# Patient Record
Sex: MALE | Race: BLACK OR AFRICAN AMERICAN | Employment: UNEMPLOYED | URBAN - NONMETROPOLITAN AREA
[De-identification: names, ages, dates, MRNs, and addresses within clinical notes are randomized per-mention and may not be internally consistent; named-entity substitution may affect disease eponyms.]

---

## 2017-01-01 ENCOUNTER — TELEPHONE (OUTPATIENT)
Dept: CARDIOLOGY CLINIC | Age: 67
End: 2017-01-01

## 2017-01-01 ENCOUNTER — APPOINTMENT (OUTPATIENT)
Dept: NUCLEAR MEDICINE | Age: 67
DRG: 291 | End: 2017-01-01
Payer: MEDICARE

## 2017-01-01 ENCOUNTER — HOSPITAL ENCOUNTER (INPATIENT)
Age: 67
LOS: 3 days | Discharge: NURSING, REHAB OR LONG-TERM CARE | DRG: 291 | End: 2017-10-16
Attending: FAMILY MEDICINE | Admitting: ANESTHESIOLOGY
Payer: MEDICARE

## 2017-01-01 ENCOUNTER — APPOINTMENT (OUTPATIENT)
Dept: CT IMAGING | Age: 67
End: 2017-01-01
Payer: MEDICARE

## 2017-01-01 ENCOUNTER — PROCEDURE VISIT (OUTPATIENT)
Dept: CARDIOLOGY CLINIC | Age: 67
End: 2017-01-01
Payer: MEDICARE

## 2017-01-01 ENCOUNTER — APPOINTMENT (OUTPATIENT)
Dept: GENERAL RADIOLOGY | Age: 67
DRG: 291 | End: 2017-01-01
Payer: MEDICARE

## 2017-01-01 ENCOUNTER — OFFICE VISIT (OUTPATIENT)
Dept: NEPHROLOGY | Age: 67
End: 2017-01-01

## 2017-01-01 ENCOUNTER — PROCEDURE VISIT (OUTPATIENT)
Dept: CARDIOLOGY | Age: 67
End: 2017-01-01

## 2017-01-01 ENCOUNTER — OFFICE VISIT (OUTPATIENT)
Dept: CARDIOLOGY | Age: 67
End: 2017-01-01

## 2017-01-01 ENCOUNTER — OFFICE VISIT (OUTPATIENT)
Dept: NEPHROLOGY | Age: 67
End: 2017-01-01
Payer: MEDICARE

## 2017-01-01 ENCOUNTER — APPOINTMENT (OUTPATIENT)
Dept: CT IMAGING | Age: 67
DRG: 682 | End: 2017-01-01
Payer: MEDICARE

## 2017-01-01 ENCOUNTER — NURSE ONLY (OUTPATIENT)
Dept: CARDIOLOGY CLINIC | Age: 67
End: 2017-01-01
Payer: MEDICARE

## 2017-01-01 ENCOUNTER — HOSPITAL ENCOUNTER (INPATIENT)
Age: 67
LOS: 3 days | Discharge: NURSING, REHAB OR LONG-TERM CARE | DRG: 682 | End: 2017-09-14
Attending: FAMILY MEDICINE | Admitting: INTERNAL MEDICINE
Payer: MEDICARE

## 2017-01-01 ENCOUNTER — APPOINTMENT (OUTPATIENT)
Dept: GENERAL RADIOLOGY | Age: 67
DRG: 682 | End: 2017-01-01
Payer: MEDICARE

## 2017-01-01 ENCOUNTER — HOSPITAL ENCOUNTER (EMERGENCY)
Age: 67
Discharge: OTHER INSTITUTION | End: 2017-09-07
Attending: FAMILY MEDICINE
Payer: MEDICARE

## 2017-01-01 ENCOUNTER — APPOINTMENT (OUTPATIENT)
Dept: CT IMAGING | Age: 67
DRG: 291 | End: 2017-01-01
Payer: MEDICARE

## 2017-01-01 ENCOUNTER — APPOINTMENT (OUTPATIENT)
Dept: GENERAL RADIOLOGY | Age: 67
End: 2017-01-01
Payer: MEDICARE

## 2017-01-01 VITALS
OXYGEN SATURATION: 95 % | RESPIRATION RATE: 20 BRPM | WEIGHT: 223.56 LBS | TEMPERATURE: 98.1 F | BODY MASS INDEX: 30.28 KG/M2 | SYSTOLIC BLOOD PRESSURE: 168 MMHG | HEIGHT: 72 IN | HEART RATE: 77 BPM | DIASTOLIC BLOOD PRESSURE: 94 MMHG

## 2017-01-01 VITALS
DIASTOLIC BLOOD PRESSURE: 78 MMHG | BODY MASS INDEX: 31.15 KG/M2 | OXYGEN SATURATION: 95 % | HEART RATE: 71 BPM | WEIGHT: 230 LBS | TEMPERATURE: 97.8 F | HEIGHT: 72 IN | SYSTOLIC BLOOD PRESSURE: 139 MMHG | RESPIRATION RATE: 19 BRPM

## 2017-01-01 VITALS — SYSTOLIC BLOOD PRESSURE: 104 MMHG | DIASTOLIC BLOOD PRESSURE: 66 MMHG | RESPIRATION RATE: 16 BRPM | HEART RATE: 68 BPM

## 2017-01-01 VITALS — HEIGHT: 72 IN | DIASTOLIC BLOOD PRESSURE: 73 MMHG | HEART RATE: 64 BPM | SYSTOLIC BLOOD PRESSURE: 121 MMHG

## 2017-01-01 VITALS — HEART RATE: 68 BPM | SYSTOLIC BLOOD PRESSURE: 102 MMHG | RESPIRATION RATE: 16 BRPM | DIASTOLIC BLOOD PRESSURE: 70 MMHG

## 2017-01-01 VITALS
HEART RATE: 50 BPM | SYSTOLIC BLOOD PRESSURE: 137 MMHG | BODY MASS INDEX: 31.56 KG/M2 | HEIGHT: 72 IN | DIASTOLIC BLOOD PRESSURE: 80 MMHG | WEIGHT: 233 LBS

## 2017-01-01 VITALS
HEIGHT: 72 IN | HEART RATE: 62 BPM | BODY MASS INDEX: 31.97 KG/M2 | SYSTOLIC BLOOD PRESSURE: 147 MMHG | WEIGHT: 236 LBS | DIASTOLIC BLOOD PRESSURE: 74 MMHG

## 2017-01-01 VITALS
WEIGHT: 226 LBS | HEIGHT: 72 IN | SYSTOLIC BLOOD PRESSURE: 138 MMHG | OXYGEN SATURATION: 95 % | TEMPERATURE: 97.8 F | RESPIRATION RATE: 18 BRPM | DIASTOLIC BLOOD PRESSURE: 70 MMHG | HEART RATE: 79 BPM | BODY MASS INDEX: 30.61 KG/M2

## 2017-01-01 VITALS — SYSTOLIC BLOOD PRESSURE: 142 MMHG | HEART RATE: 68 BPM | DIASTOLIC BLOOD PRESSURE: 80 MMHG

## 2017-01-01 VITALS — HEART RATE: 56 BPM | SYSTOLIC BLOOD PRESSURE: 122 MMHG | DIASTOLIC BLOOD PRESSURE: 84 MMHG

## 2017-01-01 DIAGNOSIS — I10 BENIGN ESSENTIAL HTN: ICD-10-CM

## 2017-01-01 DIAGNOSIS — E55.9 VITAMIN D DEFICIENCY: ICD-10-CM

## 2017-01-01 DIAGNOSIS — I50.9 ACUTE CONGESTIVE HEART FAILURE, UNSPECIFIED CONGESTIVE HEART FAILURE TYPE: ICD-10-CM

## 2017-01-01 DIAGNOSIS — E78.00 PURE HYPERCHOLESTEROLEMIA: ICD-10-CM

## 2017-01-01 DIAGNOSIS — Z98.890 S/P CARDIAC CATH: ICD-10-CM

## 2017-01-01 DIAGNOSIS — I50.22 CHF (CONGESTIVE HEART FAILURE), NYHA CLASS III, CHRONIC, SYSTOLIC (HCC): ICD-10-CM

## 2017-01-01 DIAGNOSIS — N17.9 AKI (ACUTE KIDNEY INJURY) (HCC): ICD-10-CM

## 2017-01-01 DIAGNOSIS — I50.42 CHRONIC COMBINED SYSTOLIC AND DIASTOLIC CHF (CONGESTIVE HEART FAILURE) (HCC): Primary | ICD-10-CM

## 2017-01-01 DIAGNOSIS — I73.9 PAD (PERIPHERAL ARTERY DISEASE) (HCC): ICD-10-CM

## 2017-01-01 DIAGNOSIS — E08.22 DIABETES MELLITUS DUE TO UNDERLYING CONDITION WITH STAGE 4 CHRONIC KIDNEY DISEASE, UNSPECIFIED LONG TERM INSULIN USE STATUS: ICD-10-CM

## 2017-01-01 DIAGNOSIS — D50.9 IRON DEFICIENCY ANEMIA, UNSPECIFIED IRON DEFICIENCY ANEMIA TYPE: ICD-10-CM

## 2017-01-01 DIAGNOSIS — I42.9 CARDIOMYOPATHY (HCC): ICD-10-CM

## 2017-01-01 DIAGNOSIS — R09.02 HYPOXIA: Primary | ICD-10-CM

## 2017-01-01 DIAGNOSIS — I50.42 CHRONIC COMBINED SYSTOLIC AND DIASTOLIC CHF (CONGESTIVE HEART FAILURE) (HCC): ICD-10-CM

## 2017-01-01 DIAGNOSIS — N18.4 CKD (CHRONIC KIDNEY DISEASE), STAGE IV (HCC): Primary | ICD-10-CM

## 2017-01-01 DIAGNOSIS — I50.22 SYSTOLIC CHF, CHRONIC (HCC): ICD-10-CM

## 2017-01-01 DIAGNOSIS — Z79.4 TYPE 2 DIABETES MELLITUS WITH STAGE 3 CHRONIC KIDNEY DISEASE, WITH LONG-TERM CURRENT USE OF INSULIN (HCC): ICD-10-CM

## 2017-01-01 DIAGNOSIS — N18.30 CKD (CHRONIC KIDNEY DISEASE), STAGE III (HCC): ICD-10-CM

## 2017-01-01 DIAGNOSIS — Z95.810 ICD (IMPLANTABLE CARDIOVERTER-DEFIBRILLATOR) IN PLACE: Primary | ICD-10-CM

## 2017-01-01 DIAGNOSIS — I50.43 ACUTE ON CHRONIC COMBINED SYSTOLIC AND DIASTOLIC CONGESTIVE HEART FAILURE (HCC): ICD-10-CM

## 2017-01-01 DIAGNOSIS — Z95.810 HISTORY OF IMPLANTABLE CARDIOVERTER-DEFIBRILLATOR (ICD) PLACEMENT: ICD-10-CM

## 2017-01-01 DIAGNOSIS — I69.359 HEMIPARESIS DUE TO OLD CEREBROVASCULAR ACCIDENT (HCC): ICD-10-CM

## 2017-01-01 DIAGNOSIS — N18.9 CHRONIC RENAL INSUFFICIENCY, UNSPECIFIED STAGE: ICD-10-CM

## 2017-01-01 DIAGNOSIS — N25.81 SECONDARY RENAL HYPERPARATHYROIDISM (HCC): ICD-10-CM

## 2017-01-01 DIAGNOSIS — I10 ESSENTIAL HYPERTENSION: ICD-10-CM

## 2017-01-01 DIAGNOSIS — I42.9 CARDIOMYOPATHY (HCC): Primary | ICD-10-CM

## 2017-01-01 DIAGNOSIS — N18.30 CKD (CHRONIC KIDNEY DISEASE), STAGE III (HCC): Primary | ICD-10-CM

## 2017-01-01 DIAGNOSIS — I42.9 CARDIOMYOPATHY, UNSPECIFIED TYPE (HCC): ICD-10-CM

## 2017-01-01 DIAGNOSIS — Z95.810 ICD (IMPLANTABLE CARDIOVERTER-DEFIBRILLATOR) IN PLACE: ICD-10-CM

## 2017-01-01 DIAGNOSIS — Z79.4 TYPE 2 DIABETES MELLITUS WITH COMPLICATION, WITH LONG-TERM CURRENT USE OF INSULIN (HCC): ICD-10-CM

## 2017-01-01 DIAGNOSIS — R79.89 ELEVATED D-DIMER: ICD-10-CM

## 2017-01-01 DIAGNOSIS — N18.30 TYPE 2 DIABETES MELLITUS WITH STAGE 3 CHRONIC KIDNEY DISEASE, WITH LONG-TERM CURRENT USE OF INSULIN (HCC): ICD-10-CM

## 2017-01-01 DIAGNOSIS — I21.4 NSTEMI (NON-ST ELEVATED MYOCARDIAL INFARCTION) (HCC): ICD-10-CM

## 2017-01-01 DIAGNOSIS — I10 ESSENTIAL HYPERTENSION: Primary | ICD-10-CM

## 2017-01-01 DIAGNOSIS — N18.4 DIABETES MELLITUS DUE TO UNDERLYING CONDITION WITH STAGE 4 CHRONIC KIDNEY DISEASE, UNSPECIFIED LONG TERM INSULIN USE STATUS: ICD-10-CM

## 2017-01-01 DIAGNOSIS — I25.10 CORONARY ARTERY DISEASE INVOLVING NATIVE CORONARY ARTERY OF NATIVE HEART WITHOUT ANGINA PECTORIS: Primary | ICD-10-CM

## 2017-01-01 DIAGNOSIS — E11.8 TYPE 2 DIABETES MELLITUS WITH COMPLICATION, WITH LONG-TERM CURRENT USE OF INSULIN (HCC): ICD-10-CM

## 2017-01-01 DIAGNOSIS — R94.31 ABNORMAL EKG: ICD-10-CM

## 2017-01-01 DIAGNOSIS — I25.10 CORONARY ARTERY DISEASE INVOLVING NATIVE CORONARY ARTERY OF NATIVE HEART WITHOUT ANGINA PECTORIS: ICD-10-CM

## 2017-01-01 DIAGNOSIS — E16.2 HYPOGLYCEMIA: Primary | ICD-10-CM

## 2017-01-01 DIAGNOSIS — R77.8 ELEVATED TROPONIN: ICD-10-CM

## 2017-01-01 DIAGNOSIS — E11.22 TYPE 2 DIABETES MELLITUS WITH STAGE 3 CHRONIC KIDNEY DISEASE, WITH LONG-TERM CURRENT USE OF INSULIN (HCC): ICD-10-CM

## 2017-01-01 DIAGNOSIS — D72.829 LEUKOCYTOSIS, UNSPECIFIED TYPE: ICD-10-CM

## 2017-01-01 DIAGNOSIS — I63.9 CEREBROVASCULAR ACCIDENT (CVA), UNSPECIFIED MECHANISM (HCC): Primary | ICD-10-CM

## 2017-01-01 LAB
ALBUMIN SERPL-MCNC: 3 G/DL (ref 3.5–5.1)
ALBUMIN SERPL-MCNC: 3.6 G/DL (ref 3.5–5.1)
ALBUMIN SERPL-MCNC: 3.6 G/DL (ref 3.5–5.1)
ALBUMIN SERPL-MCNC: 4.1 G/DL (ref 3.5–5.1)
ALLEN TEST: ABNORMAL
ALP BLD-CCNC: 77 U/L (ref 38–126)
ALP BLD-CCNC: 95 U/L (ref 38–126)
ALP BLD-CCNC: 98 U/L (ref 38–126)
ALP BLD-CCNC: 99 U/L (ref 38–126)
ALT SERPL-CCNC: 12 U/L (ref 11–66)
ALT SERPL-CCNC: 13 U/L (ref 11–66)
ALT SERPL-CCNC: 13 U/L (ref 11–66)
ALT SERPL-CCNC: 14 U/L (ref 11–66)
ANION GAP SERPL CALCULATED.3IONS-SCNC: 11 MEQ/L (ref 8–16)
ANION GAP SERPL CALCULATED.3IONS-SCNC: 12 MEQ/L (ref 8–16)
ANION GAP SERPL CALCULATED.3IONS-SCNC: 13 MEQ/L (ref 8–16)
ANION GAP SERPL CALCULATED.3IONS-SCNC: 13 MEQ/L (ref 8–16)
ANION GAP SERPL CALCULATED.3IONS-SCNC: 15 MEQ/L (ref 8–16)
ANION GAP SERPL CALCULATED.3IONS-SCNC: 15 MEQ/L (ref 8–16)
ANION GAP SERPL CALCULATED.3IONS-SCNC: 16 MEQ/L (ref 8–16)
ANION GAP SERPL CALCULATED.3IONS-SCNC: 16 MEQ/L (ref 8–16)
ANION GAP SERPL CALCULATED.3IONS-SCNC: 9 MEQ/L (ref 8–16)
ANISOCYTOSIS: ABNORMAL
APTT: 121.5 SECONDS (ref 22–38)
APTT: 31.4 SECONDS (ref 22–38)
APTT: 31.8 SECONDS (ref 22–38)
APTT: 32.1 SECONDS (ref 22–38)
APTT: 39.8 SECONDS (ref 22–38)
APTT: 46.6 SECONDS (ref 22–38)
APTT: 54.6 SECONDS (ref 22–38)
APTT: 61.7 SECONDS (ref 22–38)
APTT: 70.3 SECONDS (ref 22–38)
APTT: 81.4 SECONDS (ref 22–38)
AST SERPL-CCNC: 16 U/L (ref 5–40)
AST SERPL-CCNC: 16 U/L (ref 5–40)
AST SERPL-CCNC: 17 U/L (ref 5–40)
AST SERPL-CCNC: 18 U/L (ref 5–40)
AVERAGE GLUCOSE: 108 MG/DL (ref 70–126)
BACTERIA: ABNORMAL
BASE EXCESS (CALCULATED): -0.5 MMOL/L (ref -2.5–2.5)
BASE EXCESS (CALCULATED): -1.1 MMOL/L (ref -2.5–2.5)
BASE EXCESS (CALCULATED): -3.3 MMOL/L (ref -2.5–2.5)
BASE EXCESS MIXED: 0.3 MMOL/L (ref -2–3)
BASOPHILS # BLD: 0.3 %
BASOPHILS # BLD: 0.4 %
BASOPHILS # BLD: 0.5 %
BASOPHILS # BLD: 0.7 %
BASOPHILS # BLD: 0.8 %
BASOPHILS ABSOLUTE: 0 THOU/MM3 (ref 0–0.1)
BASOPHILS ABSOLUTE: 0.1 THOU/MM3 (ref 0–0.1)
BILIRUB SERPL-MCNC: 0.2 MG/DL (ref 0.3–1.2)
BILIRUB SERPL-MCNC: 0.3 MG/DL (ref 0.3–1.2)
BILIRUB SERPL-MCNC: 0.3 MG/DL (ref 0.3–1.2)
BILIRUB SERPL-MCNC: 0.4 MG/DL (ref 0.3–1.2)
BILIRUBIN DIRECT: < 0.2 MG/DL (ref 0–0.3)
BILIRUBIN DIRECT: < 0.2 MG/DL (ref 0–0.3)
BILIRUBIN URINE: NEGATIVE
BLOOD CULTURE, ROUTINE: NORMAL
BLOOD CULTURE, ROUTINE: NORMAL
BLOOD, URINE: NEGATIVE
BUN BLDV-MCNC: 25 MG/DL
BUN BLDV-MCNC: 27 MG/DL (ref 7–22)
BUN BLDV-MCNC: 28 MG/DL (ref 7–22)
BUN BLDV-MCNC: 33 MG/DL (ref 7–22)
BUN BLDV-MCNC: 33 MG/DL (ref 7–22)
BUN BLDV-MCNC: 35 MG/DL (ref 7–22)
BUN BLDV-MCNC: 36 MG/DL (ref 7–22)
BUN BLDV-MCNC: 36 MG/DL (ref 7–22)
BUN BLDV-MCNC: 41 MG/DL (ref 7–22)
BUN BLDV-MCNC: 42 MG/DL (ref 7–22)
CALCIUM SERPL-MCNC: 8.5 MG/DL
CALCIUM SERPL-MCNC: 8.8 MG/DL (ref 8.5–10.5)
CALCIUM SERPL-MCNC: 8.8 MG/DL (ref 8.5–10.5)
CALCIUM SERPL-MCNC: 9 MG/DL (ref 8.5–10.5)
CALCIUM SERPL-MCNC: 9.3 MG/DL (ref 8.5–10.5)
CALCIUM SERPL-MCNC: 9.4 MG/DL (ref 8.5–10.5)
CALCIUM SERPL-MCNC: 9.8 MG/DL (ref 8.5–10.5)
CALCIUM SERPL-MCNC: 9.8 MG/DL (ref 8.5–10.5)
CASTS: ABNORMAL /LPF
CASTS: ABNORMAL /LPF
CHARACTER, URINE: ABNORMAL
CHLORIDE BLD-SCNC: 100 MEQ/L (ref 98–111)
CHLORIDE BLD-SCNC: 103 MEQ/L (ref 98–111)
CHLORIDE BLD-SCNC: 103 MEQ/L (ref 98–111)
CHLORIDE BLD-SCNC: 104 MEQ/L (ref 98–111)
CHLORIDE BLD-SCNC: 106 MEQ/L (ref 98–111)
CHLORIDE BLD-SCNC: 107 MEQ/L (ref 98–111)
CHLORIDE BLD-SCNC: 110 MMOL/L
CHLORIDE BLD-SCNC: 98 MEQ/L (ref 98–111)
CHOLESTEROL, TOTAL: 77 MG/DL (ref 100–199)
CHP ED QC CHECK: YES
CHP ED QC CHECK: YES
CO2: 110 MMOL/L
CO2: 22 MEQ/L (ref 23–33)
CO2: 23 MEQ/L (ref 23–33)
CO2: 24 MEQ/L (ref 23–33)
CO2: 26 MEQ/L (ref 23–33)
CO2: 26 MEQ/L (ref 23–33)
CO2: 27 MEQ/L (ref 23–33)
CO2: 30 MEQ/L (ref 23–33)
COLLECTED BY:: ABNORMAL
COLOR: YELLOW
CREAT SERPL-MCNC: 2.3 MG/DL (ref 0.4–1.2)
CREAT SERPL-MCNC: 2.6 MG/DL
CREAT SERPL-MCNC: 2.6 MG/DL (ref 0.4–1.2)
CREAT SERPL-MCNC: 2.6 MG/DL (ref 0.4–1.2)
CREAT SERPL-MCNC: 2.8 MG/DL (ref 0.4–1.2)
CREAT SERPL-MCNC: 2.9 MG/DL (ref 0.4–1.2)
CREAT SERPL-MCNC: 3 MG/DL (ref 0.4–1.2)
CREAT SERPL-MCNC: 3.4 MG/DL (ref 0.4–1.2)
CRYSTALS: ABNORMAL
D-DIMER QUANTITATIVE: 1301 NG/ML FEU (ref 0–500)
DEVICE: ABNORMAL
EKG ATRIAL RATE: 60 BPM
EKG ATRIAL RATE: 61 BPM
EKG ATRIAL RATE: 76 BPM
EKG ATRIAL RATE: 77 BPM
EKG P AXIS: -6 DEGREES
EKG P AXIS: 11 DEGREES
EKG P AXIS: 20 DEGREES
EKG P AXIS: 61 DEGREES
EKG P-R INTERVAL: 166 MS
EKG P-R INTERVAL: 170 MS
EKG P-R INTERVAL: 180 MS
EKG P-R INTERVAL: 190 MS
EKG Q-T INTERVAL: 384 MS
EKG Q-T INTERVAL: 432 MS
EKG Q-T INTERVAL: 450 MS
EKG Q-T INTERVAL: 452 MS
EKG QRS DURATION: 124 MS
EKG QRS DURATION: 128 MS
EKG QRS DURATION: 128 MS
EKG QRS DURATION: 142 MS
EKG QTC CALCULATION (BAZETT): 434 MS
EKG QTC CALCULATION (BAZETT): 452 MS
EKG QTC CALCULATION (BAZETT): 453 MS
EKG QTC CALCULATION (BAZETT): 486 MS
EKG R AXIS: -13 DEGREES
EKG R AXIS: -13 DEGREES
EKG R AXIS: -18 DEGREES
EKG R AXIS: -29 DEGREES
EKG T AXIS: -131 DEGREES
EKG T AXIS: 68 DEGREES
EKG T AXIS: 92 DEGREES
EKG T AXIS: 99 DEGREES
EKG VENTRICULAR RATE: 60 BPM
EKG VENTRICULAR RATE: 61 BPM
EKG VENTRICULAR RATE: 76 BPM
EKG VENTRICULAR RATE: 77 BPM
EOSINOPHIL # BLD: 0.1 %
EOSINOPHIL # BLD: 0.7 %
EOSINOPHIL # BLD: 2.1 %
EOSINOPHIL # BLD: 2.6 %
EOSINOPHIL # BLD: 2.8 %
EOSINOPHIL # BLD: 3 %
EOSINOPHIL # BLD: 3.7 %
EOSINOPHIL # BLD: 3.8 %
EOSINOPHIL # BLD: 4 %
EOSINOPHILS ABSOLUTE: 0 THOU/MM3 (ref 0–0.4)
EOSINOPHILS ABSOLUTE: 0.1 THOU/MM3 (ref 0–0.4)
EOSINOPHILS ABSOLUTE: 0.2 THOU/MM3 (ref 0–0.4)
EOSINOPHILS ABSOLUTE: 0.2 THOU/MM3 (ref 0–0.4)
EOSINOPHILS ABSOLUTE: 0.3 THOU/MM3 (ref 0–0.4)
EOSINOPHILS ABSOLUTE: 0.4 THOU/MM3 (ref 0–0.4)
EPITHELIAL CELLS, UA: ABNORMAL /HPF
FLU A ANTIGEN: NEGATIVE
FLU B ANTIGEN: NEGATIVE
FOLATE: 19.3 NG/ML (ref 4.8–24.2)
GFR CALCULATED: 25
GFR SERPL CREATININE-BSD FRML MDRD: 22 ML/MIN/1.73M2
GFR SERPL CREATININE-BSD FRML MDRD: 25 ML/MIN/1.73M2
GFR SERPL CREATININE-BSD FRML MDRD: 26 ML/MIN/1.73M2
GFR SERPL CREATININE-BSD FRML MDRD: 27 ML/MIN/1.73M2
GFR SERPL CREATININE-BSD FRML MDRD: 30 ML/MIN/1.73M2
GFR SERPL CREATININE-BSD FRML MDRD: 30 ML/MIN/1.73M2
GFR SERPL CREATININE-BSD FRML MDRD: 34 ML/MIN/1.73M2
GLUCOSE BLD-MCNC: 101 MG/DL (ref 70–108)
GLUCOSE BLD-MCNC: 107 MG/DL (ref 70–108)
GLUCOSE BLD-MCNC: 110 MG/DL
GLUCOSE BLD-MCNC: 110 MG/DL (ref 70–108)
GLUCOSE BLD-MCNC: 116 MG/DL (ref 70–108)
GLUCOSE BLD-MCNC: 121 MG/DL (ref 70–108)
GLUCOSE BLD-MCNC: 122 MG/DL (ref 70–108)
GLUCOSE BLD-MCNC: 125 MG/DL (ref 70–108)
GLUCOSE BLD-MCNC: 132 MG/DL (ref 70–108)
GLUCOSE BLD-MCNC: 133 MG/DL (ref 70–108)
GLUCOSE BLD-MCNC: 139 MG/DL (ref 70–108)
GLUCOSE BLD-MCNC: 147 MG/DL
GLUCOSE BLD-MCNC: 150 MG/DL (ref 70–108)
GLUCOSE BLD-MCNC: 150 MG/DL (ref 70–108)
GLUCOSE BLD-MCNC: 152 MG/DL (ref 70–108)
GLUCOSE BLD-MCNC: 153 MG/DL (ref 70–108)
GLUCOSE BLD-MCNC: 154 MG/DL (ref 70–108)
GLUCOSE BLD-MCNC: 154 MG/DL (ref 70–108)
GLUCOSE BLD-MCNC: 157 MG/DL (ref 70–108)
GLUCOSE BLD-MCNC: 164 MG/DL
GLUCOSE BLD-MCNC: 164 MG/DL (ref 70–108)
GLUCOSE BLD-MCNC: 164 MG/DL (ref 70–108)
GLUCOSE BLD-MCNC: 167 MG/DL (ref 70–108)
GLUCOSE BLD-MCNC: 190 MG/DL (ref 70–108)
GLUCOSE BLD-MCNC: 221 MG/DL (ref 70–108)
GLUCOSE BLD-MCNC: 223 MG/DL (ref 70–108)
GLUCOSE BLD-MCNC: 236 MG/DL (ref 70–108)
GLUCOSE BLD-MCNC: 258 MG/DL (ref 70–108)
GLUCOSE BLD-MCNC: 54 MG/DL (ref 70–108)
GLUCOSE BLD-MCNC: 55 MG/DL
GLUCOSE BLD-MCNC: 55 MG/DL (ref 70–108)
GLUCOSE BLD-MCNC: 67 MG/DL (ref 70–108)
GLUCOSE BLD-MCNC: 87 MG/DL (ref 70–108)
GLUCOSE BLD-MCNC: 89 MG/DL (ref 70–108)
GLUCOSE BLD-MCNC: 97 MG/DL (ref 70–108)
GLUCOSE, URINE: NEGATIVE MG/DL
HBA1C MFR BLD: 5.6 % (ref 4.4–6.4)
HCO3, MIXED: 25 MMOL/L (ref 23–28)
HCO3: 23 MMOL/L (ref 23–28)
HCO3: 26 MMOL/L (ref 23–28)
HCO3: 27 MMOL/L (ref 23–28)
HCT VFR BLD CALC: 33.4 % (ref 42–52)
HCT VFR BLD CALC: 34 % (ref 42–52)
HCT VFR BLD CALC: 34.8 % (ref 42–52)
HCT VFR BLD CALC: 35.2 % (ref 42–52)
HCT VFR BLD CALC: 36.4 % (ref 42–52)
HCT VFR BLD CALC: 37.7 % (ref 42–52)
HCT VFR BLD CALC: 37.7 % (ref 42–52)
HCT VFR BLD CALC: 38 % (ref 42–52)
HCT VFR BLD CALC: 38.3 % (ref 42–52)
HCT VFR BLD CALC: 39.2 % (ref 42–52)
HDLC SERPL-MCNC: 28 MG/DL
HEMOGLOBIN: 11.1 GM/DL (ref 14–18)
HEMOGLOBIN: 11.1 GM/DL (ref 14–18)
HEMOGLOBIN: 11.2 GM/DL (ref 14–18)
HEMOGLOBIN: 11.3 GM/DL (ref 14–18)
HEMOGLOBIN: 11.6 GM/DL (ref 14–18)
HEMOGLOBIN: 12.2 GM/DL (ref 14–18)
HEMOGLOBIN: 12.5 GM/DL (ref 14–18)
HEMOGLOBIN: 12.6 GM/DL (ref 14–18)
HEMOGLOBIN: 12.7 GM/DL (ref 14–18)
HEMOGLOBIN: 12.7 GM/DL (ref 14–18)
IFIO2: 50
IFIO2: 50
INR BLD: 1.06 (ref 0.85–1.13)
INR BLD: 1.09 (ref 0.85–1.13)
INR BLD: 1.26 (ref 0.85–1.13)
KETONES, URINE: NEGATIVE
LACTIC ACID: 0.8 MMOL/L (ref 0.5–2.2)
LDL CHOLESTEROL CALCULATED: 24 MG/DL
LEUKOCYTE ESTERASE, URINE: ABNORMAL
LIPASE: 9 U/L (ref 5.6–51.3)
LV EF: 38 %
LVEF MODALITY: NORMAL
LYMPHOCYTES # BLD: 17.3 %
LYMPHOCYTES # BLD: 17.7 %
LYMPHOCYTES # BLD: 17.8 %
LYMPHOCYTES # BLD: 18.7 %
LYMPHOCYTES # BLD: 22.5 %
LYMPHOCYTES # BLD: 23.3 %
LYMPHOCYTES # BLD: 23.7 %
LYMPHOCYTES # BLD: 26 %
LYMPHOCYTES # BLD: 27.4 %
LYMPHOCYTES ABSOLUTE: 1.4 THOU/MM3 (ref 1–4.8)
LYMPHOCYTES ABSOLUTE: 1.6 THOU/MM3 (ref 1–4.8)
LYMPHOCYTES ABSOLUTE: 1.8 THOU/MM3 (ref 1–4.8)
LYMPHOCYTES ABSOLUTE: 2 THOU/MM3 (ref 1–4.8)
LYMPHOCYTES ABSOLUTE: 2 THOU/MM3 (ref 1–4.8)
LYMPHOCYTES ABSOLUTE: 2.1 THOU/MM3 (ref 1–4.8)
LYMPHOCYTES ABSOLUTE: 2.3 THOU/MM3 (ref 1–4.8)
LYMPHOCYTES ABSOLUTE: 2.7 THOU/MM3 (ref 1–4.8)
LYMPHOCYTES ABSOLUTE: 2.7 THOU/MM3 (ref 1–4.8)
MAGNESIUM: 2.1 MG/DL (ref 1.6–2.4)
MAGNESIUM: 2.1 MG/DL (ref 1.6–2.4)
MCH RBC QN AUTO: 30.9 PG (ref 27–31)
MCH RBC QN AUTO: 31 PG (ref 27–31)
MCH RBC QN AUTO: 31.3 PG (ref 27–31)
MCH RBC QN AUTO: 31.4 PG (ref 27–31)
MCH RBC QN AUTO: 31.4 PG (ref 27–31)
MCH RBC QN AUTO: 31.5 PG (ref 27–31)
MCH RBC QN AUTO: 32 PG (ref 27–31)
MCH RBC QN AUTO: 32 PG (ref 27–31)
MCH RBC QN AUTO: 32.1 PG (ref 27–31)
MCH RBC QN AUTO: 32.2 PG (ref 27–31)
MCHC RBC AUTO-ENTMCNC: 32 GM/DL (ref 33–37)
MCHC RBC AUTO-ENTMCNC: 32.4 GM/DL (ref 33–37)
MCHC RBC AUTO-ENTMCNC: 32.4 GM/DL (ref 33–37)
MCHC RBC AUTO-ENTMCNC: 32.7 GM/DL (ref 33–37)
MCHC RBC AUTO-ENTMCNC: 33.1 GM/DL (ref 33–37)
MCHC RBC AUTO-ENTMCNC: 33.1 GM/DL (ref 33–37)
MCHC RBC AUTO-ENTMCNC: 33.3 GM/DL (ref 33–37)
MCHC RBC AUTO-ENTMCNC: 33.5 GM/DL (ref 33–37)
MCV RBC AUTO: 95.9 FL (ref 80–94)
MCV RBC AUTO: 96.1 FL (ref 80–94)
MCV RBC AUTO: 96.1 FL (ref 80–94)
MCV RBC AUTO: 96.7 FL (ref 80–94)
MCV RBC AUTO: 96.9 FL (ref 80–94)
MCV RBC AUTO: 97 FL (ref 80–94)
MCV RBC AUTO: 97.1 FL (ref 80–94)
MCV RBC AUTO: 97.2 FL (ref 80–94)
MCV RBC AUTO: 97.3 FL (ref 80–94)
MCV RBC AUTO: 98.3 FL (ref 80–94)
MISCELLANEOUS LAB TEST RESULT: ABNORMAL
MODE: ABNORMAL
MODE: ABNORMAL
MONOCYTES # BLD: 10.2 %
MONOCYTES # BLD: 11.7 %
MONOCYTES # BLD: 11.9 %
MONOCYTES # BLD: 11.9 %
MONOCYTES # BLD: 12.7 %
MONOCYTES # BLD: 12.8 %
MONOCYTES # BLD: 13.6 %
MONOCYTES # BLD: 13.7 %
MONOCYTES # BLD: 14.2 %
MONOCYTES ABSOLUTE: 0.8 THOU/MM3 (ref 0.4–1.3)
MONOCYTES ABSOLUTE: 1 THOU/MM3 (ref 0.4–1.3)
MONOCYTES ABSOLUTE: 1.1 THOU/MM3 (ref 0.4–1.3)
MONOCYTES ABSOLUTE: 1.1 THOU/MM3 (ref 0.4–1.3)
MONOCYTES ABSOLUTE: 1.2 THOU/MM3 (ref 0.4–1.3)
MONOCYTES ABSOLUTE: 1.3 THOU/MM3 (ref 0.4–1.3)
MONOCYTES ABSOLUTE: 1.4 THOU/MM3 (ref 0.4–1.3)
MONOCYTES ABSOLUTE: 1.4 THOU/MM3 (ref 0.4–1.3)
MONOCYTES ABSOLUTE: 1.5 THOU/MM3 (ref 0.4–1.3)
MRSA SCREEN: NORMAL
NITRITE, URINE: NEGATIVE
NUCLEATED RED BLOOD CELLS: 0 /100 WBC
NUCLEATED RED BLOOD CELLS: 2 /100 WBC
O2 SAT, MIXED: 95 %
O2 SATURATION: 91 %
O2 SATURATION: 99 %
O2 SATURATION: 99 %
ORGANISM: ABNORMAL
OSMOLALITY CALCULATION: 289.4 MOSMOL/KG (ref 275–300)
OSMOLALITY CALCULATION: 291.2 MOSMOL/KG (ref 275–300)
OSMOLALITY CALCULATION: 291.8 MOSMOL/KG (ref 275–300)
PCO2, MIXED VENOUS: 40 MMHG (ref 41–51)
PCO2: 43 MMHG (ref 35–45)
PCO2: 56 MMHG (ref 35–45)
PCO2: 56 MMHG (ref 35–45)
PDW BLD-RTO: 16.7 % (ref 11.5–14.5)
PDW BLD-RTO: 17 % (ref 11.5–14.5)
PDW BLD-RTO: 17.2 % (ref 11.5–14.5)
PDW BLD-RTO: 17.4 % (ref 11.5–14.5)
PDW BLD-RTO: 17.5 % (ref 11.5–14.5)
PDW BLD-RTO: 18.4 % (ref 11.5–14.5)
PDW BLD-RTO: 18.6 % (ref 11.5–14.5)
PDW BLD-RTO: 19.1 % (ref 11.5–14.5)
PDW BLD-RTO: 19.8 % (ref 11.5–14.5)
PDW BLD-RTO: 19.9 % (ref 11.5–14.5)
PH BLOOD GAS: 7.28 (ref 7.35–7.45)
PH BLOOD GAS: 7.29 (ref 7.35–7.45)
PH BLOOD GAS: 7.33 (ref 7.35–7.45)
PH UA: 6
PH, MIXED: 7.4 (ref 7.31–7.41)
PHOSPHORUS: 3.9 MG/DL (ref 2.4–4.7)
PHOSPHORUS: 4 MG/DL (ref 2.4–4.7)
PLATELET # BLD: 149 THOU/MM3 (ref 130–400)
PLATELET # BLD: 162 THOU/MM3 (ref 130–400)
PLATELET # BLD: 164 THOU/MM3 (ref 130–400)
PLATELET # BLD: 167 THOU/MM3 (ref 130–400)
PLATELET # BLD: 168 THOU/MM3 (ref 130–400)
PLATELET # BLD: 177 THOU/MM3 (ref 130–400)
PLATELET # BLD: 179 THOU/MM3 (ref 130–400)
PLATELET # BLD: 180 THOU/MM3 (ref 130–400)
PLATELET # BLD: 183 THOU/MM3 (ref 130–400)
PLATELET # BLD: 194 THOU/MM3 (ref 130–400)
PLATELET # BLD: 212 THOU/MM3 (ref 130–400)
PMV BLD AUTO: 8 MCM (ref 7.4–10.4)
PMV BLD AUTO: 8 MCM (ref 7.4–10.4)
PMV BLD AUTO: 8.3 MCM (ref 7.4–10.4)
PMV BLD AUTO: 8.4 MCM (ref 7.4–10.4)
PMV BLD AUTO: 8.5 MCM (ref 7.4–10.4)
PMV BLD AUTO: 8.6 MCM (ref 7.4–10.4)
PMV BLD AUTO: 8.7 MCM (ref 7.4–10.4)
PMV BLD AUTO: 9.2 MCM (ref 7.4–10.4)
PMV BLD AUTO: 9.5 MCM (ref 7.4–10.4)
PMV BLD AUTO: 9.6 MCM (ref 7.4–10.4)
PO2 MIXED: 77 MMHG (ref 25–40)
PO2: 171 MMHG (ref 71–104)
PO2: 175 MMHG (ref 71–104)
PO2: 70 MMHG (ref 71–104)
POTASSIUM SERPL-SCNC: 4.4 MEQ/L (ref 3.5–5.2)
POTASSIUM SERPL-SCNC: 4.6 MEQ/L (ref 3.5–5.2)
POTASSIUM SERPL-SCNC: 4.7 MEQ/L (ref 3.5–5.2)
POTASSIUM SERPL-SCNC: 4.7 MMOL/L
POTASSIUM SERPL-SCNC: 4.9 MEQ/L (ref 3.5–5.2)
POTASSIUM SERPL-SCNC: 4.9 MEQ/L (ref 3.5–5.2)
PRO-BNP: 1293 PG/ML (ref 0–900)
PRO-BNP: ABNORMAL PG/ML (ref 0–900)
PROCALCITONIN: 0.32 NG/ML (ref 0.01–0.09)
PROTEIN UA: 100 MG/DL
RBC # BLD: 3.45 MILL/MM3 (ref 4.7–6.1)
RBC # BLD: 3.54 MILL/MM3 (ref 4.7–6.1)
RBC # BLD: 3.58 MILL/MM3 (ref 4.7–6.1)
RBC # BLD: 3.58 MILL/MM3 (ref 4.7–6.1)
RBC # BLD: 3.75 MILL/MM3 (ref 4.7–6.1)
RBC # BLD: 3.88 MILL/MM3 (ref 4.7–6.1)
RBC # BLD: 3.89 MILL/MM3 (ref 4.7–6.1)
RBC # BLD: 3.95 MILL/MM3 (ref 4.7–6.1)
RBC # BLD: 3.98 MILL/MM3 (ref 4.7–6.1)
RBC # BLD: 4.06 MILL/MM3 (ref 4.7–6.1)
RBC # BLD: NORMAL 10*6/UL
RBC URINE: ABNORMAL /HPF
RENAL EPITHELIAL, UA: ABNORMAL
SEG NEUTROPHILS: 55.3 %
SEG NEUTROPHILS: 55.8 %
SEG NEUTROPHILS: 60.4 %
SEG NEUTROPHILS: 60.6 %
SEG NEUTROPHILS: 61.5 %
SEG NEUTROPHILS: 65.6 %
SEG NEUTROPHILS: 67.5 %
SEG NEUTROPHILS: 69 %
SEG NEUTROPHILS: 69.5 %
SEGMENTED NEUTROPHILS ABSOLUTE COUNT: 4 THOU/MM3 (ref 1.8–7.7)
SEGMENTED NEUTROPHILS ABSOLUTE COUNT: 4.3 THOU/MM3 (ref 1.8–7.7)
SEGMENTED NEUTROPHILS ABSOLUTE COUNT: 5.4 THOU/MM3 (ref 1.8–7.7)
SEGMENTED NEUTROPHILS ABSOLUTE COUNT: 6 THOU/MM3 (ref 1.8–7.7)
SEGMENTED NEUTROPHILS ABSOLUTE COUNT: 6.2 THOU/MM3 (ref 1.8–7.7)
SEGMENTED NEUTROPHILS ABSOLUTE COUNT: 6.9 THOU/MM3 (ref 1.8–7.7)
SEGMENTED NEUTROPHILS ABSOLUTE COUNT: 7 THOU/MM3 (ref 1.8–7.7)
SEGMENTED NEUTROPHILS ABSOLUTE COUNT: 7.1 THOU/MM3 (ref 1.8–7.7)
SEGMENTED NEUTROPHILS ABSOLUTE COUNT: 7.8 THOU/MM3 (ref 1.8–7.7)
SET PEEP: 6 MMHG
SET PEEP: 6 MMHG
SET PRESS SUPP: 12 CMH2O
SET PRESS SUPP: 8 CMH2O
SITE: ABNORMAL
SODIUM BLD-SCNC: 140 MEQ/L (ref 135–145)
SODIUM BLD-SCNC: 141 MEQ/L (ref 135–145)
SODIUM BLD-SCNC: 142 MEQ/L (ref 135–145)
SODIUM BLD-SCNC: 142 MMOL/L
SODIUM BLD-SCNC: 143 MEQ/L (ref 135–145)
SODIUM BLD-SCNC: 145 MEQ/L (ref 135–145)
SOURCE, BLOOD GAS: ABNORMAL
SPECIFIC GRAVITY UA: 1.01 (ref 1–1.03)
T4 FREE: 0.96 NG/DL (ref 0.93–1.76)
T4 FREE: 1.18 NG/DL (ref 0.93–1.76)
TOTAL PROTEIN: 6.9 G/DL (ref 6.1–8)
TOTAL PROTEIN: 7.8 G/DL (ref 6.1–8)
TOTAL PROTEIN: 8 G/DL (ref 6.1–8)
TOTAL PROTEIN: 8.3 G/DL (ref 6.1–8)
TRIGL SERPL-MCNC: 123 MG/DL (ref 0–199)
TROPONIN T: 0.03 NG/ML
TROPONIN T: 0.04 NG/ML
TROPONIN T: 0.04 NG/ML
TROPONIN T: 0.07 NG/ML
TROPONIN T: 0.1 NG/ML
TROPONIN T: 0.11 NG/ML
TSH SERPL DL<=0.05 MIU/L-ACNC: 2.45 UIU/ML (ref 0.4–4.2)
TSH SERPL DL<=0.05 MIU/L-ACNC: 2.71 UIU/ML (ref 0.4–4.2)
URINE CULTURE, ROUTINE: ABNORMAL
UROBILINOGEN, URINE: 0.2 EU/DL
VITAMIN B-12: 689 PG/ML (ref 211–911)
WBC # BLD: 10.2 THOU/MM3 (ref 4.8–10.8)
WBC # BLD: 10.3 THOU/MM3 (ref 4.8–10.8)
WBC # BLD: 11.3 THOU/MM3 (ref 4.8–10.8)
WBC # BLD: 11.4 THOU/MM3 (ref 4.8–10.8)
WBC # BLD: 11.4 THOU/MM3 (ref 4.8–10.8)
WBC # BLD: 12.8 THOU/MM3 (ref 4.8–10.8)
WBC # BLD: 7.2 THOU/MM3 (ref 4.8–10.8)
WBC # BLD: 7.8 THOU/MM3 (ref 4.8–10.8)
WBC # BLD: 8 THOU/MM3 (ref 4.8–10.8)
WBC # BLD: 9.1 THOU/MM3 (ref 4.8–10.8)
WBC UA: ABNORMAL /HPF
YEAST: ABNORMAL

## 2017-01-01 PROCEDURE — 6370000000 HC RX 637 (ALT 250 FOR IP): Performed by: ANESTHESIOLOGY

## 2017-01-01 PROCEDURE — 1036F TOBACCO NON-USER: CPT | Performed by: INTERNAL MEDICINE

## 2017-01-01 PROCEDURE — 94660 CPAP INITIATION&MGMT: CPT

## 2017-01-01 PROCEDURE — 87040 BLOOD CULTURE FOR BACTERIA: CPT

## 2017-01-01 PROCEDURE — 82948 REAGENT STRIP/BLOOD GLUCOSE: CPT

## 2017-01-01 PROCEDURE — 99214 OFFICE O/P EST MOD 30 MIN: CPT | Performed by: INTERNAL MEDICINE

## 2017-01-01 PROCEDURE — 84484 ASSAY OF TROPONIN QUANT: CPT

## 2017-01-01 PROCEDURE — 6360000002 HC RX W HCPCS: Performed by: ANESTHESIOLOGY

## 2017-01-01 PROCEDURE — 87184 SC STD DISK METHOD PER PLATE: CPT

## 2017-01-01 PROCEDURE — 3046F HEMOGLOBIN A1C LEVEL >9.0%: CPT | Performed by: INTERNAL MEDICINE

## 2017-01-01 PROCEDURE — 85025 COMPLETE CBC W/AUTO DIFF WBC: CPT

## 2017-01-01 PROCEDURE — 80048 BASIC METABOLIC PNL TOTAL CA: CPT

## 2017-01-01 PROCEDURE — 92611 MOTION FLUOROSCOPY/SWALLOW: CPT

## 2017-01-01 PROCEDURE — 80050 GENERAL HEALTH PANEL: CPT

## 2017-01-01 PROCEDURE — 85730 THROMBOPLASTIN TIME PARTIAL: CPT

## 2017-01-01 PROCEDURE — G8427 DOCREV CUR MEDS BY ELIG CLIN: HCPCS | Performed by: INTERNAL MEDICINE

## 2017-01-01 PROCEDURE — 85610 PROTHROMBIN TIME: CPT

## 2017-01-01 PROCEDURE — 1111F DSCHRG MED/CURRENT MED MERGE: CPT | Performed by: INTERNAL MEDICINE

## 2017-01-01 PROCEDURE — 3017F COLORECTAL CA SCREEN DOC REV: CPT | Performed by: INTERNAL MEDICINE

## 2017-01-01 PROCEDURE — G8417 CALC BMI ABV UP PARAM F/U: HCPCS | Performed by: INTERNAL MEDICINE

## 2017-01-01 PROCEDURE — 6370000000 HC RX 637 (ALT 250 FOR IP): Performed by: FAMILY MEDICINE

## 2017-01-01 PROCEDURE — 99285 EMERGENCY DEPT VISIT HI MDM: CPT

## 2017-01-01 PROCEDURE — 92610 EVALUATE SWALLOWING FUNCTION: CPT

## 2017-01-01 PROCEDURE — 70450 CT HEAD/BRAIN W/O DYE: CPT

## 2017-01-01 PROCEDURE — 85049 AUTOMATED PLATELET COUNT: CPT

## 2017-01-01 PROCEDURE — 36415 COLL VENOUS BLD VENIPUNCTURE: CPT

## 2017-01-01 PROCEDURE — G8598 ASA/ANTIPLAT THER USED: HCPCS | Performed by: INTERNAL MEDICINE

## 2017-01-01 PROCEDURE — 99221 1ST HOSP IP/OBS SF/LOW 40: CPT | Performed by: INTERNAL MEDICINE

## 2017-01-01 PROCEDURE — 84439 ASSAY OF FREE THYROXINE: CPT

## 2017-01-01 PROCEDURE — 2580000003 HC RX 258: Performed by: ANESTHESIOLOGY

## 2017-01-01 PROCEDURE — 93282 PRGRMG EVAL IMPLANTABLE DFB: CPT | Performed by: INTERNAL MEDICINE

## 2017-01-01 PROCEDURE — 2140000000 HC CCU INTERMEDIATE R&B

## 2017-01-01 PROCEDURE — 4040F PNEUMOC VAC/ADMIN/RCVD: CPT | Performed by: INTERNAL MEDICINE

## 2017-01-01 PROCEDURE — 99213 OFFICE O/P EST LOW 20 MIN: CPT | Performed by: INTERNAL MEDICINE

## 2017-01-01 PROCEDURE — 99232 SBSQ HOSP IP/OBS MODERATE 35: CPT | Performed by: HOSPITALIST

## 2017-01-01 PROCEDURE — 96374 THER/PROPH/DIAG INJ IV PUSH: CPT

## 2017-01-01 PROCEDURE — 80053 COMPREHEN METABOLIC PANEL: CPT

## 2017-01-01 PROCEDURE — 83690 ASSAY OF LIPASE: CPT

## 2017-01-01 PROCEDURE — A6210 FOAM DRG >16<=48 SQ IN W/O B: HCPCS

## 2017-01-01 PROCEDURE — 82746 ASSAY OF FOLIC ACID SERUM: CPT

## 2017-01-01 PROCEDURE — 2700000000 HC OXYGEN THERAPY PER DAY

## 2017-01-01 PROCEDURE — 3430000000 HC RX DIAGNOSTIC RADIOPHARMACEUTICAL: Performed by: ANESTHESIOLOGY

## 2017-01-01 PROCEDURE — 87186 SC STD MICRODIL/AGAR DIL: CPT

## 2017-01-01 PROCEDURE — 97110 THERAPEUTIC EXERCISES: CPT

## 2017-01-01 PROCEDURE — 82803 BLOOD GASES ANY COMBINATION: CPT

## 2017-01-01 PROCEDURE — 93000 ELECTROCARDIOGRAM COMPLETE: CPT | Performed by: INTERNAL MEDICINE

## 2017-01-01 PROCEDURE — 92526 ORAL FUNCTION THERAPY: CPT

## 2017-01-01 PROCEDURE — 83735 ASSAY OF MAGNESIUM: CPT

## 2017-01-01 PROCEDURE — 97163 PT EVAL HIGH COMPLEX 45 MIN: CPT

## 2017-01-01 PROCEDURE — G8484 FLU IMMUNIZE NO ADMIN: HCPCS | Performed by: INTERNAL MEDICINE

## 2017-01-01 PROCEDURE — 2580000003 HC RX 258

## 2017-01-01 PROCEDURE — 85379 FIBRIN DEGRADATION QUANT: CPT

## 2017-01-01 PROCEDURE — 99223 1ST HOSP IP/OBS HIGH 75: CPT | Performed by: ANESTHESIOLOGY

## 2017-01-01 PROCEDURE — 97530 THERAPEUTIC ACTIVITIES: CPT

## 2017-01-01 PROCEDURE — 99205 OFFICE O/P NEW HI 60 MIN: CPT | Performed by: INTERNAL MEDICINE

## 2017-01-01 PROCEDURE — 1123F ACP DISCUSS/DSCN MKR DOCD: CPT | Performed by: INTERNAL MEDICINE

## 2017-01-01 PROCEDURE — 82248 BILIRUBIN DIRECT: CPT

## 2017-01-01 PROCEDURE — 99239 HOSP IP/OBS DSCHRG MGMT >30: CPT | Performed by: HOSPITALIST

## 2017-01-01 PROCEDURE — 6370000000 HC RX 637 (ALT 250 FOR IP): Performed by: NURSE PRACTITIONER

## 2017-01-01 PROCEDURE — 94640 AIRWAY INHALATION TREATMENT: CPT

## 2017-01-01 PROCEDURE — 1210000002 HC MED SURG R&B - NEUROSCIENCE

## 2017-01-01 PROCEDURE — 2580000003 HC RX 258: Performed by: FAMILY MEDICINE

## 2017-01-01 PROCEDURE — 78582 LUNG VENTILAT&PERFUS IMAGING: CPT

## 2017-01-01 PROCEDURE — 99239 HOSP IP/OBS DSCHRG MGMT >30: CPT | Performed by: INTERNAL MEDICINE

## 2017-01-01 PROCEDURE — 71010 XR CHEST PORTABLE: CPT

## 2017-01-01 PROCEDURE — 93005 ELECTROCARDIOGRAM TRACING: CPT

## 2017-01-01 PROCEDURE — 87147 CULTURE TYPE IMMUNOLOGIC: CPT

## 2017-01-01 PROCEDURE — 83605 ASSAY OF LACTIC ACID: CPT

## 2017-01-01 PROCEDURE — 87086 URINE CULTURE/COLONY COUNT: CPT

## 2017-01-01 PROCEDURE — 71020 XR CHEST STANDARD TWO VW: CPT

## 2017-01-01 PROCEDURE — 93295 DEV INTERROG REMOTE 1/2/MLT: CPT | Performed by: INTERNAL MEDICINE

## 2017-01-01 PROCEDURE — 99232 SBSQ HOSP IP/OBS MODERATE 35: CPT | Performed by: NURSE PRACTITIONER

## 2017-01-01 PROCEDURE — G8419 CALC BMI OUT NRM PARAM NOF/U: HCPCS | Performed by: INTERNAL MEDICINE

## 2017-01-01 PROCEDURE — 2500000003 HC RX 250 WO HCPCS: Performed by: ANESTHESIOLOGY

## 2017-01-01 PROCEDURE — 36600 WITHDRAWAL OF ARTERIAL BLOOD: CPT

## 2017-01-01 PROCEDURE — A9540 TC99M MAA: HCPCS | Performed by: ANESTHESIOLOGY

## 2017-01-01 PROCEDURE — 84443 ASSAY THYROID STIM HORMONE: CPT

## 2017-01-01 PROCEDURE — 93005 ELECTROCARDIOGRAM TRACING: CPT | Performed by: ANESTHESIOLOGY

## 2017-01-01 PROCEDURE — 84100 ASSAY OF PHOSPHORUS: CPT

## 2017-01-01 PROCEDURE — 82607 VITAMIN B-12: CPT

## 2017-01-01 PROCEDURE — 99232 SBSQ HOSP IP/OBS MODERATE 35: CPT | Performed by: INTERNAL MEDICINE

## 2017-01-01 PROCEDURE — 87081 CULTURE SCREEN ONLY: CPT

## 2017-01-01 PROCEDURE — 87804 INFLUENZA ASSAY W/OPTIC: CPT

## 2017-01-01 PROCEDURE — A9558 XE133 XENON 10MCI: HCPCS | Performed by: ANESTHESIOLOGY

## 2017-01-01 PROCEDURE — 80061 LIPID PANEL: CPT

## 2017-01-01 PROCEDURE — 81001 URINALYSIS AUTO W/SCOPE: CPT

## 2017-01-01 PROCEDURE — G8979 MOBILITY GOAL STATUS: HCPCS

## 2017-01-01 PROCEDURE — 97166 OT EVAL MOD COMPLEX 45 MIN: CPT

## 2017-01-01 PROCEDURE — 84145 PROCALCITONIN (PCT): CPT

## 2017-01-01 PROCEDURE — G8978 MOBILITY CURRENT STATUS: HCPCS

## 2017-01-01 PROCEDURE — 83880 ASSAY OF NATRIURETIC PEPTIDE: CPT

## 2017-01-01 PROCEDURE — 96361 HYDRATE IV INFUSION ADD-ON: CPT

## 2017-01-01 PROCEDURE — 93296 REM INTERROG EVL PM/IDS: CPT | Performed by: INTERNAL MEDICINE

## 2017-01-01 PROCEDURE — 93005 ELECTROCARDIOGRAM TRACING: CPT | Performed by: FAMILY MEDICINE

## 2017-01-01 PROCEDURE — 93306 TTE W/DOPPLER COMPLETE: CPT

## 2017-01-01 PROCEDURE — 6360000002 HC RX W HCPCS: Performed by: FAMILY MEDICINE

## 2017-01-01 PROCEDURE — 87077 CULTURE AEROBIC IDENTIFY: CPT

## 2017-01-01 PROCEDURE — 85027 COMPLETE CBC AUTOMATED: CPT

## 2017-01-01 PROCEDURE — 83036 HEMOGLOBIN GLYCOSYLATED A1C: CPT

## 2017-01-01 RX ORDER — ALBUTEROL SULFATE 90 UG/1
2 AEROSOL, METERED RESPIRATORY (INHALATION) EVERY 6 HOURS PRN
Status: DISCONTINUED | OUTPATIENT
Start: 2017-01-01 | End: 2017-01-01 | Stop reason: HOSPADM

## 2017-01-01 RX ORDER — ACETAMINOPHEN 325 MG/1
650 TABLET ORAL EVERY 4 HOURS PRN
Status: DISCONTINUED | OUTPATIENT
Start: 2017-01-01 | End: 2017-01-01 | Stop reason: HOSPADM

## 2017-01-01 RX ORDER — DEXTROSE MONOHYDRATE 25 G/50ML
INJECTION, SOLUTION INTRAVENOUS
Status: COMPLETED
Start: 2017-01-01 | End: 2017-01-01

## 2017-01-01 RX ORDER — SODIUM CHLORIDE 0.9 % (FLUSH) 0.9 %
10 SYRINGE (ML) INJECTION PRN
Status: DISCONTINUED | OUTPATIENT
Start: 2017-01-01 | End: 2017-01-01 | Stop reason: SDUPTHER

## 2017-01-01 RX ORDER — CLONIDINE HYDROCHLORIDE 0.2 MG/1
0.2 TABLET ORAL EVERY 8 HOURS
Status: DISCONTINUED | OUTPATIENT
Start: 2017-01-01 | End: 2017-01-01 | Stop reason: HOSPADM

## 2017-01-01 RX ORDER — IPRATROPIUM BROMIDE AND ALBUTEROL SULFATE 2.5; .5 MG/3ML; MG/3ML
1 SOLUTION RESPIRATORY (INHALATION)
Status: DISCONTINUED | OUTPATIENT
Start: 2017-01-01 | End: 2017-01-01 | Stop reason: HOSPADM

## 2017-01-01 RX ORDER — PREGABALIN 50 MG/1
50 CAPSULE ORAL 3 TIMES DAILY
Status: DISCONTINUED | OUTPATIENT
Start: 2017-01-01 | End: 2017-01-01

## 2017-01-01 RX ORDER — NICOTINE POLACRILEX 4 MG
15 LOZENGE BUCCAL PRN
Status: DISCONTINUED | OUTPATIENT
Start: 2017-01-01 | End: 2017-01-01 | Stop reason: SDUPTHER

## 2017-01-01 RX ORDER — HEPARIN SODIUM 10000 [USP'U]/100ML
9 INJECTION, SOLUTION INTRAVENOUS CONTINUOUS
Status: DISCONTINUED | OUTPATIENT
Start: 2017-01-01 | End: 2017-01-01 | Stop reason: HOSPADM

## 2017-01-01 RX ORDER — OXYBUTYNIN CHLORIDE 5 MG/1
5 TABLET ORAL 3 TIMES DAILY
Status: DISCONTINUED | OUTPATIENT
Start: 2017-01-01 | End: 2017-01-01 | Stop reason: HOSPADM

## 2017-01-01 RX ORDER — IPRATROPIUM BROMIDE AND ALBUTEROL SULFATE 2.5; .5 MG/3ML; MG/3ML
1 SOLUTION RESPIRATORY (INHALATION) ONCE
Status: COMPLETED | OUTPATIENT
Start: 2017-01-01 | End: 2017-01-01

## 2017-01-01 RX ORDER — METOPROLOL SUCCINATE 100 MG/1
200 TABLET, EXTENDED RELEASE ORAL DAILY
Status: DISCONTINUED | OUTPATIENT
Start: 2017-01-01 | End: 2017-01-01 | Stop reason: HOSPADM

## 2017-01-01 RX ORDER — ACETAMINOPHEN 325 MG/1
650 TABLET ORAL 3 TIMES DAILY PRN
Status: ON HOLD | COMMUNITY
End: 2018-01-01

## 2017-01-01 RX ORDER — HYDRALAZINE HYDROCHLORIDE 50 MG/1
100 TABLET, FILM COATED ORAL 3 TIMES DAILY
Status: DISCONTINUED | OUTPATIENT
Start: 2017-01-01 | End: 2017-01-01 | Stop reason: HOSPADM

## 2017-01-01 RX ORDER — INSULIN GLARGINE 100 [IU]/ML
30 INJECTION, SOLUTION SUBCUTANEOUS EVERY MORNING
Status: DISCONTINUED | OUTPATIENT
Start: 2017-01-01 | End: 2017-01-01 | Stop reason: HOSPADM

## 2017-01-01 RX ORDER — SENNA AND DOCUSATE SODIUM 50; 8.6 MG/1; MG/1
1 TABLET, FILM COATED ORAL DAILY PRN
Status: DISCONTINUED | OUTPATIENT
Start: 2017-01-01 | End: 2017-01-01 | Stop reason: HOSPADM

## 2017-01-01 RX ORDER — LOSARTAN POTASSIUM 25 MG/1
25 TABLET ORAL DAILY
Status: DISCONTINUED | OUTPATIENT
Start: 2017-01-01 | End: 2017-01-01 | Stop reason: HOSPADM

## 2017-01-01 RX ORDER — SODIUM CHLORIDE 9 MG/ML
INJECTION, SOLUTION INTRAVENOUS CONTINUOUS
Status: DISCONTINUED | OUTPATIENT
Start: 2017-01-01 | End: 2017-01-01 | Stop reason: HOSPADM

## 2017-01-01 RX ORDER — HEPARIN SODIUM 10000 [USP'U]/100ML
9 INJECTION, SOLUTION INTRAVENOUS CONTINUOUS
Status: DISCONTINUED | OUTPATIENT
Start: 2017-01-01 | End: 2017-01-01 | Stop reason: SDUPTHER

## 2017-01-01 RX ORDER — CLONIDINE HYDROCHLORIDE 0.2 MG/1
0.2 TABLET ORAL EVERY 8 HOURS
Qty: 90 TABLET | Refills: 1 | Status: ON HOLD
Start: 2017-01-01 | End: 2018-01-01

## 2017-01-01 RX ORDER — SODIUM CHLORIDE 0.9 % (FLUSH) 0.9 %
10 SYRINGE (ML) INJECTION PRN
Status: DISCONTINUED | OUTPATIENT
Start: 2017-01-01 | End: 2017-01-01 | Stop reason: HOSPADM

## 2017-01-01 RX ORDER — DILTIAZEM HYDROCHLORIDE 120 MG/1
120 CAPSULE, COATED, EXTENDED RELEASE ORAL 2 TIMES DAILY
Status: DISCONTINUED | OUTPATIENT
Start: 2017-01-01 | End: 2017-01-01 | Stop reason: HOSPADM

## 2017-01-01 RX ORDER — DIGOXIN 125 MCG
125 TABLET ORAL DAILY
Status: DISCONTINUED | OUTPATIENT
Start: 2017-01-01 | End: 2017-01-01 | Stop reason: HOSPADM

## 2017-01-01 RX ORDER — ACETAMINOPHEN 325 MG/1
650 TABLET ORAL DAILY
COMMUNITY
End: 2017-01-01 | Stop reason: ALTCHOICE

## 2017-01-01 RX ORDER — ASPIRIN 325 MG
325 TABLET ORAL DAILY
Status: DISCONTINUED | OUTPATIENT
Start: 2017-01-01 | End: 2017-01-01 | Stop reason: HOSPADM

## 2017-01-01 RX ORDER — DEXTROSE MONOHYDRATE 50 MG/ML
100 INJECTION, SOLUTION INTRAVENOUS PRN
Status: DISCONTINUED | OUTPATIENT
Start: 2017-01-01 | End: 2017-01-01 | Stop reason: HOSPADM

## 2017-01-01 RX ORDER — GABAPENTIN 300 MG/1
300 CAPSULE ORAL 3 TIMES DAILY
Status: ON HOLD | COMMUNITY
End: 2018-01-01

## 2017-01-01 RX ORDER — HYDRALAZINE HYDROCHLORIDE 100 MG/1
100 TABLET, FILM COATED ORAL 3 TIMES DAILY
Qty: 60 TABLET | Status: ON HOLD | COMMUNITY
Start: 2017-01-01 | End: 2018-01-01

## 2017-01-01 RX ORDER — CLOPIDOGREL BISULFATE 75 MG/1
75 TABLET ORAL DAILY
Status: DISCONTINUED | OUTPATIENT
Start: 2017-01-01 | End: 2017-01-01 | Stop reason: HOSPADM

## 2017-01-01 RX ORDER — TRAMADOL HYDROCHLORIDE 50 MG/1
100 TABLET ORAL EVERY 6 HOURS PRN
Status: DISCONTINUED | OUTPATIENT
Start: 2017-01-01 | End: 2017-01-01

## 2017-01-01 RX ORDER — NITROGLYCERIN 0.4 MG/1
0.4 TABLET SUBLINGUAL EVERY 5 MIN PRN
Status: DISCONTINUED | OUTPATIENT
Start: 2017-01-01 | End: 2017-01-01 | Stop reason: HOSPADM

## 2017-01-01 RX ORDER — DEXTROSE MONOHYDRATE 25 G/50ML
12.5 INJECTION, SOLUTION INTRAVENOUS PRN
Status: DISCONTINUED | OUTPATIENT
Start: 2017-01-01 | End: 2017-01-01 | Stop reason: HOSPADM

## 2017-01-01 RX ORDER — NICOTINE POLACRILEX 4 MG
15 LOZENGE BUCCAL PRN
Status: DISCONTINUED | OUTPATIENT
Start: 2017-01-01 | End: 2017-01-01 | Stop reason: HOSPADM

## 2017-01-01 RX ORDER — CALCITRIOL 0.25 UG/1
0.25 CAPSULE, LIQUID FILLED ORAL DAILY
COMMUNITY
End: 2018-01-01 | Stop reason: ALTCHOICE

## 2017-01-01 RX ORDER — HYDROCODONE BITARTRATE AND ACETAMINOPHEN 5; 325 MG/1; MG/1
2 TABLET ORAL EVERY 4 HOURS PRN
Status: DISCONTINUED | OUTPATIENT
Start: 2017-01-01 | End: 2017-01-01 | Stop reason: HOSPADM

## 2017-01-01 RX ORDER — SODIUM CHLORIDE 0.9 % (FLUSH) 0.9 %
10 SYRINGE (ML) INJECTION EVERY 12 HOURS SCHEDULED
Status: DISCONTINUED | OUTPATIENT
Start: 2017-01-01 | End: 2017-01-01 | Stop reason: SDUPTHER

## 2017-01-01 RX ORDER — HEPARIN SODIUM 1000 [USP'U]/ML
60 INJECTION, SOLUTION INTRAVENOUS; SUBCUTANEOUS ONCE
Status: DISCONTINUED | OUTPATIENT
Start: 2017-01-01 | End: 2017-01-01 | Stop reason: ALTCHOICE

## 2017-01-01 RX ORDER — GUAIFENESIN/DEXTROMETHORPHAN 100-10MG/5
10 SYRUP ORAL EVERY 6 HOURS PRN
Status: DISCONTINUED | OUTPATIENT
Start: 2017-01-01 | End: 2017-01-01 | Stop reason: HOSPADM

## 2017-01-01 RX ORDER — FAMOTIDINE 20 MG/1
20 TABLET, FILM COATED ORAL DAILY
Status: DISCONTINUED | OUTPATIENT
Start: 2017-01-01 | End: 2017-01-01 | Stop reason: HOSPADM

## 2017-01-01 RX ORDER — ONDANSETRON 4 MG/1
4 TABLET, ORALLY DISINTEGRATING ORAL EVERY 8 HOURS PRN
Status: ON HOLD | COMMUNITY
End: 2018-01-01

## 2017-01-01 RX ORDER — SODIUM CHLORIDE 9 MG/ML
50 INJECTION, SOLUTION INTRAVENOUS CONTINUOUS
Qty: 1000 ML | Refills: 0 | Status: ON HOLD
Start: 2017-01-01 | End: 2018-01-01

## 2017-01-01 RX ORDER — SODIUM CHLORIDE 0.9 % (FLUSH) 0.9 %
10 SYRINGE (ML) INJECTION EVERY 12 HOURS SCHEDULED
Status: DISCONTINUED | OUTPATIENT
Start: 2017-01-01 | End: 2017-01-01 | Stop reason: HOSPADM

## 2017-01-01 RX ORDER — ACETAMINOPHEN 325 MG/1
650 TABLET ORAL EVERY 4 HOURS PRN
Status: DISCONTINUED | OUTPATIENT
Start: 2017-01-01 | End: 2017-01-01 | Stop reason: SDUPTHER

## 2017-01-01 RX ORDER — BUMETANIDE 0.25 MG/ML
0.5 INJECTION, SOLUTION INTRAMUSCULAR; INTRAVENOUS 2 TIMES DAILY
Status: DISCONTINUED | OUTPATIENT
Start: 2017-01-01 | End: 2017-01-01 | Stop reason: HOSPADM

## 2017-01-01 RX ORDER — LISINOPRIL 10 MG/1
10 TABLET ORAL DAILY
Status: DISCONTINUED | OUTPATIENT
Start: 2017-01-01 | End: 2017-01-01

## 2017-01-01 RX ORDER — ISOSORBIDE MONONITRATE 60 MG/1
240 TABLET, EXTENDED RELEASE ORAL DAILY
Status: DISCONTINUED | OUTPATIENT
Start: 2017-01-01 | End: 2017-01-01 | Stop reason: HOSPADM

## 2017-01-01 RX ORDER — ALLOPURINOL 100 MG/1
100 TABLET ORAL DAILY
Status: DISCONTINUED | OUTPATIENT
Start: 2017-01-01 | End: 2017-01-01 | Stop reason: HOSPADM

## 2017-01-01 RX ORDER — INSULIN GLARGINE 100 [IU]/ML
26 INJECTION, SOLUTION SUBCUTANEOUS NIGHTLY
Status: DISCONTINUED | OUTPATIENT
Start: 2017-01-01 | End: 2017-01-01 | Stop reason: HOSPADM

## 2017-01-01 RX ORDER — LEVOFLOXACIN 5 MG/ML
500 INJECTION, SOLUTION INTRAVENOUS ONCE
Status: COMPLETED | OUTPATIENT
Start: 2017-01-01 | End: 2017-01-01

## 2017-01-01 RX ORDER — ATORVASTATIN CALCIUM 40 MG/1
40 TABLET, FILM COATED ORAL NIGHTLY
Status: DISCONTINUED | OUTPATIENT
Start: 2017-01-01 | End: 2017-01-01 | Stop reason: HOSPADM

## 2017-01-01 RX ORDER — HEPARIN SODIUM 1000 [USP'U]/ML
4000 INJECTION, SOLUTION INTRAVENOUS; SUBCUTANEOUS ONCE
Status: COMPLETED | OUTPATIENT
Start: 2017-01-01 | End: 2017-01-01

## 2017-01-01 RX ORDER — DEXTROSE MONOHYDRATE 25 G/50ML
12.5 INJECTION, SOLUTION INTRAVENOUS PRN
Status: DISCONTINUED | OUTPATIENT
Start: 2017-01-01 | End: 2017-01-01 | Stop reason: SDUPTHER

## 2017-01-01 RX ORDER — GABAPENTIN 300 MG/1
300 CAPSULE ORAL 3 TIMES DAILY
Status: DISCONTINUED | OUTPATIENT
Start: 2017-01-01 | End: 2017-01-01 | Stop reason: HOSPADM

## 2017-01-01 RX ORDER — HEPARIN SODIUM 1000 [USP'U]/ML
4000 INJECTION, SOLUTION INTRAVENOUS; SUBCUTANEOUS PRN
Status: DISCONTINUED | OUTPATIENT
Start: 2017-01-01 | End: 2017-01-01 | Stop reason: HOSPADM

## 2017-01-01 RX ORDER — HEPARIN SODIUM 1000 [USP'U]/ML
2000 INJECTION, SOLUTION INTRAVENOUS; SUBCUTANEOUS PRN
Status: DISCONTINUED | OUTPATIENT
Start: 2017-01-01 | End: 2017-01-01 | Stop reason: SDUPTHER

## 2017-01-01 RX ORDER — HEPARIN SODIUM 1000 [USP'U]/ML
2000 INJECTION, SOLUTION INTRAVENOUS; SUBCUTANEOUS PRN
Status: DISCONTINUED | OUTPATIENT
Start: 2017-01-01 | End: 2017-01-01 | Stop reason: HOSPADM

## 2017-01-01 RX ORDER — CALCITRIOL 0.25 UG/1
0.25 CAPSULE, LIQUID FILLED ORAL DAILY
Status: DISCONTINUED | OUTPATIENT
Start: 2017-01-01 | End: 2017-01-01 | Stop reason: HOSPADM

## 2017-01-01 RX ORDER — DOCUSATE SODIUM 100 MG/1
100 CAPSULE, LIQUID FILLED ORAL 2 TIMES DAILY PRN
Status: DISCONTINUED | OUTPATIENT
Start: 2017-01-01 | End: 2017-01-01 | Stop reason: HOSPADM

## 2017-01-01 RX ORDER — DEXTROSE MONOHYDRATE 25 G/50ML
12.5 INJECTION, SOLUTION INTRAVENOUS ONCE
Status: COMPLETED | OUTPATIENT
Start: 2017-01-01 | End: 2017-01-01

## 2017-01-01 RX ORDER — INSULIN GLARGINE 100 [IU]/ML
32 INJECTION, SOLUTION SUBCUTANEOUS NIGHTLY
Status: DISCONTINUED | OUTPATIENT
Start: 2017-01-01 | End: 2017-01-01 | Stop reason: HOSPADM

## 2017-01-01 RX ORDER — DEXTROSE MONOHYDRATE 50 MG/ML
100 INJECTION, SOLUTION INTRAVENOUS PRN
Status: DISCONTINUED | OUTPATIENT
Start: 2017-01-01 | End: 2017-01-01 | Stop reason: SDUPTHER

## 2017-01-01 RX ORDER — ARGININE/ASCORBATE SOD/VITE AC 4.5 G/9.2G
1 POWDER IN PACKET (EA) ORAL DAILY
COMMUNITY
End: 2017-01-01

## 2017-01-01 RX ORDER — DOCUSATE SODIUM 100 MG/1
100 CAPSULE, LIQUID FILLED ORAL PRN
Status: DISCONTINUED | OUTPATIENT
Start: 2017-01-01 | End: 2017-01-01 | Stop reason: HOSPADM

## 2017-01-01 RX ORDER — 0.9 % SODIUM CHLORIDE 0.9 %
250 INTRAVENOUS SOLUTION INTRAVENOUS ONCE
Status: COMPLETED | OUTPATIENT
Start: 2017-01-01 | End: 2017-01-01

## 2017-01-01 RX ORDER — HYDROCODONE BITARTRATE AND ACETAMINOPHEN 5; 325 MG/1; MG/1
1 TABLET ORAL EVERY 4 HOURS PRN
Status: DISCONTINUED | OUTPATIENT
Start: 2017-01-01 | End: 2017-01-01 | Stop reason: HOSPADM

## 2017-01-01 RX ORDER — ONDANSETRON 2 MG/ML
4 INJECTION INTRAMUSCULAR; INTRAVENOUS EVERY 6 HOURS PRN
Status: DISCONTINUED | OUTPATIENT
Start: 2017-01-01 | End: 2017-01-01 | Stop reason: HOSPADM

## 2017-01-01 RX ORDER — HEPARIN SODIUM 1000 [USP'U]/ML
4000 INJECTION, SOLUTION INTRAVENOUS; SUBCUTANEOUS PRN
Status: DISCONTINUED | OUTPATIENT
Start: 2017-01-01 | End: 2017-01-01 | Stop reason: SDUPTHER

## 2017-01-01 RX ADMIN — OXYBUTYNIN CHLORIDE 5 MG: 5 TABLET ORAL at 17:34

## 2017-01-01 RX ADMIN — VITAMIN D, TAB 1000IU (100/BT) 4000 UNITS: 25 TAB at 11:05

## 2017-01-01 RX ADMIN — IPRATROPIUM BROMIDE AND ALBUTEROL SULFATE 1 AMPULE: .5; 3 SOLUTION RESPIRATORY (INHALATION) at 10:15

## 2017-01-01 RX ADMIN — VITAMIN D, TAB 1000IU (100/BT) 4000 UNITS: 25 TAB at 11:04

## 2017-01-01 RX ADMIN — METOPROLOL SUCCINATE 200 MG: 100 TABLET, FILM COATED, EXTENDED RELEASE ORAL at 11:04

## 2017-01-01 RX ADMIN — ASPIRIN 325 MG: 325 TABLET, COATED ORAL at 09:16

## 2017-01-01 RX ADMIN — HEPARIN SODIUM AND DEXTROSE 9 UNITS/KG/HR: 10000; 5 INJECTION INTRAVENOUS at 00:54

## 2017-01-01 RX ADMIN — FAMOTIDINE 20 MG: 20 TABLET, FILM COATED ORAL at 11:07

## 2017-01-01 RX ADMIN — IPRATROPIUM BROMIDE AND ALBUTEROL SULFATE 1 AMPULE: .5; 3 SOLUTION RESPIRATORY (INHALATION) at 08:38

## 2017-01-01 RX ADMIN — CLOPIDOGREL 75 MG: 75 TABLET, FILM COATED ORAL at 11:07

## 2017-01-01 RX ADMIN — PREGABALIN 50 MG: 50 CAPSULE ORAL at 15:28

## 2017-01-01 RX ADMIN — CLONIDINE HYDROCHLORIDE 0.2 MG: 0.2 TABLET ORAL at 17:32

## 2017-01-01 RX ADMIN — ALLOPURINOL 100 MG: 100 TABLET ORAL at 11:03

## 2017-01-01 RX ADMIN — ASPIRIN 325 MG: 325 TABLET, COATED ORAL at 11:03

## 2017-01-01 RX ADMIN — OXYBUTYNIN CHLORIDE 5 MG: 5 TABLET ORAL at 15:10

## 2017-01-01 RX ADMIN — Medication 5.2 MILLICURIE: at 09:04

## 2017-01-01 RX ADMIN — DILTIAZEM HYDROCHLORIDE 120 MG: 120 CAPSULE, COATED, EXTENDED RELEASE ORAL at 08:40

## 2017-01-01 RX ADMIN — Medication 10 ML: at 00:00

## 2017-01-01 RX ADMIN — HEPARIN SODIUM 2000 UNITS: 1000 INJECTION, SOLUTION INTRAVENOUS; SUBCUTANEOUS at 03:11

## 2017-01-01 RX ADMIN — HYDROCODONE BITARTRATE AND ACETAMINOPHEN 2 TABLET: 5; 325 TABLET ORAL at 08:33

## 2017-01-01 RX ADMIN — INSULIN LISPRO 2 UNITS: 100 INJECTION, SOLUTION INTRAVENOUS; SUBCUTANEOUS at 17:35

## 2017-01-01 RX ADMIN — LISINOPRIL 10 MG: 10 TABLET ORAL at 11:04

## 2017-01-01 RX ADMIN — OXYBUTYNIN CHLORIDE 5 MG: 5 TABLET ORAL at 20:24

## 2017-01-01 RX ADMIN — ASPIRIN 325 MG: 325 TABLET, COATED ORAL at 10:52

## 2017-01-01 RX ADMIN — CALCITRIOL 0.25 MCG: 0.25 CAPSULE, LIQUID FILLED ORAL at 11:05

## 2017-01-01 RX ADMIN — CLONIDINE HYDROCHLORIDE 0.3 MG: 0.2 TABLET ORAL at 20:45

## 2017-01-01 RX ADMIN — IPRATROPIUM BROMIDE AND ALBUTEROL SULFATE 1 AMPULE: .5; 3 SOLUTION RESPIRATORY (INHALATION) at 21:50

## 2017-01-01 RX ADMIN — LOSARTAN POTASSIUM 25 MG: 25 TABLET, FILM COATED ORAL at 11:06

## 2017-01-01 RX ADMIN — CLOPIDOGREL 75 MG: 75 TABLET, FILM COATED ORAL at 10:52

## 2017-01-01 RX ADMIN — BUMETANIDE 0.5 MG: 0.25 INJECTION, SOLUTION INTRAMUSCULAR; INTRAVENOUS at 00:33

## 2017-01-01 RX ADMIN — GABAPENTIN 300 MG: 300 CAPSULE ORAL at 00:01

## 2017-01-01 RX ADMIN — OXYBUTYNIN CHLORIDE 5 MG: 5 TABLET ORAL at 16:18

## 2017-01-01 RX ADMIN — ISOSORBIDE MONONITRATE 240 MG: 60 TABLET ORAL at 11:04

## 2017-01-01 RX ADMIN — METOPROLOL SUCCINATE 200 MG: 100 TABLET, FILM COATED, EXTENDED RELEASE ORAL at 09:13

## 2017-01-01 RX ADMIN — OXYBUTYNIN CHLORIDE 5 MG: 5 TABLET ORAL at 00:34

## 2017-01-01 RX ADMIN — OXYBUTYNIN CHLORIDE 5 MG: 5 TABLET ORAL at 00:01

## 2017-01-01 RX ADMIN — OXYBUTYNIN CHLORIDE 5 MG: 5 TABLET ORAL at 11:04

## 2017-01-01 RX ADMIN — HYDRALAZINE HYDROCHLORIDE 100 MG: 50 TABLET, FILM COATED ORAL at 11:03

## 2017-01-01 RX ADMIN — HYDRALAZINE HYDROCHLORIDE 100 MG: 50 TABLET, FILM COATED ORAL at 20:25

## 2017-01-01 RX ADMIN — DILTIAZEM HYDROCHLORIDE 120 MG: 120 CAPSULE, COATED, EXTENDED RELEASE ORAL at 20:24

## 2017-01-01 RX ADMIN — HYDRALAZINE HYDROCHLORIDE 100 MG: 50 TABLET, FILM COATED ORAL at 13:41

## 2017-01-01 RX ADMIN — Medication 10 ML: at 10:56

## 2017-01-01 RX ADMIN — METOPROLOL SUCCINATE 200 MG: 100 TABLET, FILM COATED, EXTENDED RELEASE ORAL at 09:15

## 2017-01-01 RX ADMIN — INSULIN LISPRO 2 UNITS: 100 INJECTION, SOLUTION INTRAVENOUS; SUBCUTANEOUS at 11:12

## 2017-01-01 RX ADMIN — CALCITRIOL 0.25 MCG: 0.25 CAPSULE, LIQUID FILLED ORAL at 09:16

## 2017-01-01 RX ADMIN — DILTIAZEM HYDROCHLORIDE 120 MG: 120 CAPSULE, COATED, EXTENDED RELEASE ORAL at 11:07

## 2017-01-01 RX ADMIN — HEPARIN SODIUM AND DEXTROSE 12 UNITS/KG/HR: 10000; 5 INJECTION INTRAVENOUS at 17:54

## 2017-01-01 RX ADMIN — METOPROLOL SUCCINATE 200 MG: 100 TABLET, FILM COATED, EXTENDED RELEASE ORAL at 10:53

## 2017-01-01 RX ADMIN — DILTIAZEM HYDROCHLORIDE 120 MG: 120 CAPSULE, COATED, EXTENDED RELEASE ORAL at 00:01

## 2017-01-01 RX ADMIN — CLOPIDOGREL 75 MG: 75 TABLET, FILM COATED ORAL at 11:03

## 2017-01-01 RX ADMIN — IPRATROPIUM BROMIDE AND ALBUTEROL SULFATE 1 AMPULE: .5; 3 SOLUTION RESPIRATORY (INHALATION) at 13:09

## 2017-01-01 RX ADMIN — LEVOFLOXACIN 500 MG: 5 INJECTION, SOLUTION INTRAVENOUS at 20:01

## 2017-01-01 RX ADMIN — DEXTROSE MONOHYDRATE 25 ML: 25 INJECTION, SOLUTION INTRAVENOUS at 07:36

## 2017-01-01 RX ADMIN — FAMOTIDINE 20 MG: 20 TABLET, FILM COATED ORAL at 10:52

## 2017-01-01 RX ADMIN — GABAPENTIN 300 MG: 300 CAPSULE ORAL at 17:32

## 2017-01-01 RX ADMIN — IPRATROPIUM BROMIDE AND ALBUTEROL SULFATE 1 AMPULE: .5; 3 SOLUTION RESPIRATORY (INHALATION) at 20:12

## 2017-01-01 RX ADMIN — GABAPENTIN 300 MG: 300 CAPSULE ORAL at 11:07

## 2017-01-01 RX ADMIN — HEPARIN SODIUM AND DEXTROSE 9 UNITS/KG/HR: 10000; 5 INJECTION INTRAVENOUS at 19:49

## 2017-01-01 RX ADMIN — OXYBUTYNIN CHLORIDE 5 MG: 5 TABLET ORAL at 13:41

## 2017-01-01 RX ADMIN — CLOPIDOGREL 75 MG: 75 TABLET, FILM COATED ORAL at 09:16

## 2017-01-01 RX ADMIN — INSULIN GLARGINE 30 UNITS: 100 INJECTION, SOLUTION SUBCUTANEOUS at 09:16

## 2017-01-01 RX ADMIN — OXYBUTYNIN CHLORIDE 5 MG: 5 TABLET ORAL at 13:55

## 2017-01-01 RX ADMIN — ASPIRIN 325 MG: 325 TABLET, COATED ORAL at 08:33

## 2017-01-01 RX ADMIN — DILTIAZEM HYDROCHLORIDE 120 MG: 120 CAPSULE, COATED, EXTENDED RELEASE ORAL at 11:03

## 2017-01-01 RX ADMIN — OXYBUTYNIN CHLORIDE 5 MG: 5 TABLET ORAL at 11:02

## 2017-01-01 RX ADMIN — ATORVASTATIN CALCIUM 40 MG: 40 TABLET, FILM COATED ORAL at 20:24

## 2017-01-01 RX ADMIN — HYDRALAZINE HYDROCHLORIDE 100 MG: 50 TABLET, FILM COATED ORAL at 13:55

## 2017-01-01 RX ADMIN — IPRATROPIUM BROMIDE AND ALBUTEROL SULFATE 1 AMPULE: .5; 3 SOLUTION RESPIRATORY (INHALATION) at 17:58

## 2017-01-01 RX ADMIN — DILTIAZEM HYDROCHLORIDE 120 MG: 120 CAPSULE, COATED, EXTENDED RELEASE ORAL at 09:16

## 2017-01-01 RX ADMIN — HEPARIN SODIUM AND DEXTROSE 14 UNITS/KG/HR: 10000; 5 INJECTION INTRAVENOUS at 04:33

## 2017-01-01 RX ADMIN — HYDRALAZINE HYDROCHLORIDE 100 MG: 50 TABLET, FILM COATED ORAL at 09:13

## 2017-01-01 RX ADMIN — IPRATROPIUM BROMIDE AND ALBUTEROL SULFATE 1 AMPULE: .5; 3 SOLUTION RESPIRATORY (INHALATION) at 20:00

## 2017-01-01 RX ADMIN — GABAPENTIN 300 MG: 300 CAPSULE ORAL at 09:13

## 2017-01-01 RX ADMIN — IPRATROPIUM BROMIDE AND ALBUTEROL SULFATE 1 AMPULE: .5; 3 SOLUTION RESPIRATORY (INHALATION) at 15:43

## 2017-01-01 RX ADMIN — HEPARIN SODIUM 2000 UNITS: 1000 INJECTION, SOLUTION INTRAVENOUS; SUBCUTANEOUS at 17:52

## 2017-01-01 RX ADMIN — OXYBUTYNIN CHLORIDE 5 MG: 5 TABLET ORAL at 09:15

## 2017-01-01 RX ADMIN — DILTIAZEM HYDROCHLORIDE 120 MG: 120 CAPSULE, COATED, EXTENDED RELEASE ORAL at 00:33

## 2017-01-01 RX ADMIN — VITAMIN D, TAB 1000IU (100/BT) 4000 UNITS: 25 TAB at 11:02

## 2017-01-01 RX ADMIN — LOSARTAN POTASSIUM 25 MG: 25 TABLET, FILM COATED ORAL at 10:53

## 2017-01-01 RX ADMIN — HYDRALAZINE HYDROCHLORIDE 100 MG: 50 TABLET, FILM COATED ORAL at 00:34

## 2017-01-01 RX ADMIN — ALLOPURINOL 100 MG: 100 TABLET ORAL at 11:05

## 2017-01-01 RX ADMIN — OXYBUTYNIN CHLORIDE 5 MG: 5 TABLET ORAL at 10:54

## 2017-01-01 RX ADMIN — ATORVASTATIN CALCIUM 40 MG: 40 TABLET, FILM COATED ORAL at 19:29

## 2017-01-01 RX ADMIN — HYDRALAZINE HYDROCHLORIDE 100 MG: 50 TABLET, FILM COATED ORAL at 20:45

## 2017-01-01 RX ADMIN — ENOXAPARIN SODIUM 30 MG: 30 INJECTION SUBCUTANEOUS at 09:16

## 2017-01-01 RX ADMIN — ALLOPURINOL 100 MG: 100 TABLET ORAL at 13:43

## 2017-01-01 RX ADMIN — ENOXAPARIN SODIUM 30 MG: 30 INJECTION SUBCUTANEOUS at 11:04

## 2017-01-01 RX ADMIN — INSULIN GLARGINE 30 UNITS: 100 INJECTION, SOLUTION SUBCUTANEOUS at 13:41

## 2017-01-01 RX ADMIN — INSULIN GLARGINE 30 UNITS: 100 INJECTION, SOLUTION SUBCUTANEOUS at 10:54

## 2017-01-01 RX ADMIN — HYDRALAZINE HYDROCHLORIDE 100 MG: 50 TABLET, FILM COATED ORAL at 09:16

## 2017-01-01 RX ADMIN — ASPIRIN 325 MG: 325 TABLET, COATED ORAL at 11:07

## 2017-01-01 RX ADMIN — OXYBUTYNIN CHLORIDE 5 MG: 5 TABLET ORAL at 19:29

## 2017-01-01 RX ADMIN — CLONIDINE HYDROCHLORIDE 0.2 MG: 0.2 TABLET ORAL at 20:26

## 2017-01-01 RX ADMIN — CALCITRIOL 0.25 MCG: 0.25 CAPSULE, LIQUID FILLED ORAL at 10:52

## 2017-01-01 RX ADMIN — HYDRALAZINE HYDROCHLORIDE 100 MG: 50 TABLET, FILM COATED ORAL at 19:29

## 2017-01-01 RX ADMIN — CLOPIDOGREL 75 MG: 75 TABLET, FILM COATED ORAL at 09:13

## 2017-01-01 RX ADMIN — FAMOTIDINE 20 MG: 20 TABLET, FILM COATED ORAL at 09:13

## 2017-01-01 RX ADMIN — PREGABALIN 50 MG: 50 CAPSULE ORAL at 13:41

## 2017-01-01 RX ADMIN — FAMOTIDINE 20 MG: 20 TABLET, FILM COATED ORAL at 11:05

## 2017-01-01 RX ADMIN — CLOPIDOGREL 75 MG: 75 TABLET, FILM COATED ORAL at 11:05

## 2017-01-01 RX ADMIN — ISOSORBIDE MONONITRATE 240 MG: 60 TABLET ORAL at 10:53

## 2017-01-01 RX ADMIN — OXYBUTYNIN CHLORIDE 5 MG: 5 TABLET ORAL at 20:45

## 2017-01-01 RX ADMIN — VITAMIN D, TAB 1000IU (100/BT) 4000 UNITS: 25 TAB at 09:14

## 2017-01-01 RX ADMIN — CLONIDINE HYDROCHLORIDE 0.2 MG: 0.2 TABLET ORAL at 00:33

## 2017-01-01 RX ADMIN — ALLOPURINOL 100 MG: 100 TABLET ORAL at 10:52

## 2017-01-01 RX ADMIN — HEPARIN SODIUM AND DEXTROSE 14.03 UNITS/KG/HR: 10000; 5 INJECTION INTRAVENOUS at 13:00

## 2017-01-01 RX ADMIN — SODIUM CHLORIDE: 9 INJECTION, SOLUTION INTRAVENOUS at 07:42

## 2017-01-01 RX ADMIN — CALCITRIOL 0.25 MCG: 0.25 CAPSULE, LIQUID FILLED ORAL at 11:03

## 2017-01-01 RX ADMIN — Medication 4.3 MILLICURIE: at 09:14

## 2017-01-01 RX ADMIN — HYDRALAZINE HYDROCHLORIDE 100 MG: 50 TABLET, FILM COATED ORAL at 15:10

## 2017-01-01 RX ADMIN — ISOSORBIDE MONONITRATE 240 MG: 60 TABLET ORAL at 11:03

## 2017-01-01 RX ADMIN — DILTIAZEM HYDROCHLORIDE 120 MG: 120 CAPSULE, COATED, EXTENDED RELEASE ORAL at 11:05

## 2017-01-01 RX ADMIN — Medication 6 UNITS: at 13:54

## 2017-01-01 RX ADMIN — ALLOPURINOL 100 MG: 100 TABLET ORAL at 09:16

## 2017-01-01 RX ADMIN — INSULIN LISPRO 2 UNITS: 100 INJECTION, SOLUTION INTRAVENOUS; SUBCUTANEOUS at 20:51

## 2017-01-01 RX ADMIN — BUMETANIDE 0.5 MG: 0.25 INJECTION, SOLUTION INTRAMUSCULAR; INTRAVENOUS at 00:01

## 2017-01-01 RX ADMIN — CLONIDINE HYDROCHLORIDE 0.2 MG: 0.2 TABLET ORAL at 10:54

## 2017-01-01 RX ADMIN — IPRATROPIUM BROMIDE AND ALBUTEROL SULFATE 1 AMPULE: .5; 3 SOLUTION RESPIRATORY (INHALATION) at 22:14

## 2017-01-01 RX ADMIN — METOPROLOL SUCCINATE 200 MG: 100 TABLET, FILM COATED, EXTENDED RELEASE ORAL at 11:02

## 2017-01-01 RX ADMIN — FUROSEMIDE 60 MG: 40 TABLET ORAL at 11:05

## 2017-01-01 RX ADMIN — HYDROCODONE BITARTRATE AND ACETAMINOPHEN 2 TABLET: 5; 325 TABLET ORAL at 19:06

## 2017-01-01 RX ADMIN — Medication 10 ML: at 00:33

## 2017-01-01 RX ADMIN — DIGOXIN 125 MCG: 0.12 TABLET ORAL at 10:52

## 2017-01-01 RX ADMIN — HYDRALAZINE HYDROCHLORIDE 100 MG: 50 TABLET, FILM COATED ORAL at 17:33

## 2017-01-01 RX ADMIN — INSULIN GLARGINE 30 UNITS: 100 INJECTION, SOLUTION SUBCUTANEOUS at 16:15

## 2017-01-01 RX ADMIN — HYDRALAZINE HYDROCHLORIDE 100 MG: 50 TABLET, FILM COATED ORAL at 10:53

## 2017-01-01 RX ADMIN — Medication 10 ML: at 09:02

## 2017-01-01 RX ADMIN — GUAIFENESIN AND DEXTROMETHORPHAN 10 ML: 100; 10 SYRUP ORAL at 11:09

## 2017-01-01 RX ADMIN — GABAPENTIN 300 MG: 300 CAPSULE ORAL at 00:33

## 2017-01-01 RX ADMIN — PREGABALIN 50 MG: 50 CAPSULE ORAL at 11:16

## 2017-01-01 RX ADMIN — PREGABALIN 50 MG: 50 CAPSULE ORAL at 20:24

## 2017-01-01 RX ADMIN — Medication 10 ML: at 19:35

## 2017-01-01 RX ADMIN — DILTIAZEM HYDROCHLORIDE 120 MG: 120 CAPSULE, COATED, EXTENDED RELEASE ORAL at 20:45

## 2017-01-01 RX ADMIN — CLONIDINE HYDROCHLORIDE 0.2 MG: 0.2 TABLET ORAL at 15:00

## 2017-01-01 RX ADMIN — Medication 1 UNITS: at 22:26

## 2017-01-01 RX ADMIN — CLONIDINE HYDROCHLORIDE 0.3 MG: 0.2 TABLET ORAL at 11:05

## 2017-01-01 RX ADMIN — CLONIDINE HYDROCHLORIDE 0.2 MG: 0.2 TABLET ORAL at 09:16

## 2017-01-01 RX ADMIN — ATORVASTATIN CALCIUM 40 MG: 40 TABLET, FILM COATED ORAL at 00:33

## 2017-01-01 RX ADMIN — DILTIAZEM HYDROCHLORIDE 120 MG: 120 CAPSULE, COATED, EXTENDED RELEASE ORAL at 10:52

## 2017-01-01 RX ADMIN — HYDRALAZINE HYDROCHLORIDE 100 MG: 50 TABLET, FILM COATED ORAL at 15:00

## 2017-01-01 RX ADMIN — DIGOXIN 125 MCG: 0.12 TABLET ORAL at 11:07

## 2017-01-01 RX ADMIN — ISOSORBIDE MONONITRATE 240 MG: 60 TABLET ORAL at 11:06

## 2017-01-01 RX ADMIN — PREGABALIN 50 MG: 50 CAPSULE ORAL at 09:16

## 2017-01-01 RX ADMIN — CLONIDINE HYDROCHLORIDE 0.2 MG: 0.2 TABLET ORAL at 09:13

## 2017-01-01 RX ADMIN — ATORVASTATIN CALCIUM 40 MG: 40 TABLET, FILM COATED ORAL at 00:01

## 2017-01-01 RX ADMIN — Medication 10 ML: at 11:11

## 2017-01-01 RX ADMIN — GABAPENTIN 300 MG: 300 CAPSULE ORAL at 15:00

## 2017-01-01 RX ADMIN — INSULIN GLARGINE 30 UNITS: 100 INJECTION, SOLUTION SUBCUTANEOUS at 08:41

## 2017-01-01 RX ADMIN — HYDRALAZINE HYDROCHLORIDE 100 MG: 50 TABLET, FILM COATED ORAL at 11:04

## 2017-01-01 RX ADMIN — HEPARIN SODIUM 4000 UNITS: 1000 INJECTION, SOLUTION INTRAVENOUS; SUBCUTANEOUS at 19:50

## 2017-01-01 RX ADMIN — OXYBUTYNIN CHLORIDE 5 MG: 5 TABLET ORAL at 15:00

## 2017-01-01 RX ADMIN — CLONIDINE HYDROCHLORIDE 0.2 MG: 0.2 TABLET ORAL at 02:33

## 2017-01-01 RX ADMIN — BUMETANIDE 0.5 MG: 0.25 INJECTION, SOLUTION INTRAMUSCULAR; INTRAVENOUS at 08:40

## 2017-01-01 RX ADMIN — FAMOTIDINE 20 MG: 20 TABLET, FILM COATED ORAL at 09:16

## 2017-01-01 RX ADMIN — ISOSORBIDE MONONITRATE 240 MG: 60 TABLET ORAL at 09:15

## 2017-01-01 RX ADMIN — HYDRALAZINE HYDROCHLORIDE 100 MG: 50 TABLET, FILM COATED ORAL at 00:00

## 2017-01-01 RX ADMIN — SODIUM CHLORIDE 250 ML: 9 INJECTION, SOLUTION INTRAVENOUS at 08:36

## 2017-01-01 RX ADMIN — ASPIRIN 325 MG: 325 TABLET, COATED ORAL at 11:05

## 2017-01-01 RX ADMIN — VITAMIN D, TAB 1000IU (100/BT) 4000 UNITS: 25 TAB at 16:21

## 2017-01-01 RX ADMIN — CLONIDINE HYDROCHLORIDE 0.2 MG: 0.2 TABLET ORAL at 11:07

## 2017-01-01 RX ADMIN — HYDROCODONE BITARTRATE AND ACETAMINOPHEN 2 TABLET: 5; 325 TABLET ORAL at 13:20

## 2017-01-01 RX ADMIN — IPRATROPIUM BROMIDE AND ALBUTEROL SULFATE 1 AMPULE: .5; 3 SOLUTION RESPIRATORY (INHALATION) at 12:04

## 2017-01-01 RX ADMIN — CALCITRIOL 0.25 MCG: 0.25 CAPSULE, LIQUID FILLED ORAL at 09:13

## 2017-01-01 RX ADMIN — CALCITRIOL 0.25 MCG: 0.25 CAPSULE, LIQUID FILLED ORAL at 11:07

## 2017-01-01 RX ADMIN — ALLOPURINOL 100 MG: 100 TABLET ORAL at 11:06

## 2017-01-01 RX ADMIN — BUMETANIDE 0.5 MG: 0.25 INJECTION, SOLUTION INTRAMUSCULAR; INTRAVENOUS at 11:20

## 2017-01-01 RX ADMIN — GABAPENTIN 300 MG: 300 CAPSULE ORAL at 15:11

## 2017-01-01 RX ADMIN — PREGABALIN 50 MG: 50 CAPSULE ORAL at 20:45

## 2017-01-01 RX ADMIN — LOSARTAN POTASSIUM 25 MG: 25 TABLET, FILM COATED ORAL at 09:13

## 2017-01-01 RX ADMIN — ENOXAPARIN SODIUM 30 MG: 30 INJECTION SUBCUTANEOUS at 11:16

## 2017-01-01 RX ADMIN — VITAMIN D, TAB 1000IU (100/BT) 4000 UNITS: 25 TAB at 10:54

## 2017-01-01 RX ADMIN — FAMOTIDINE 20 MG: 20 TABLET, FILM COATED ORAL at 11:03

## 2017-01-01 RX ADMIN — BUMETANIDE 0.5 MG: 0.25 INJECTION, SOLUTION INTRAMUSCULAR; INTRAVENOUS at 10:52

## 2017-01-01 RX ADMIN — ISOSORBIDE MONONITRATE 240 MG: 60 TABLET ORAL at 08:40

## 2017-01-01 RX ADMIN — METOPROLOL SUCCINATE 200 MG: 100 TABLET, FILM COATED, EXTENDED RELEASE ORAL at 11:05

## 2017-01-01 RX ADMIN — GABAPENTIN 300 MG: 300 CAPSULE ORAL at 19:25

## 2017-01-01 RX ADMIN — GABAPENTIN 300 MG: 300 CAPSULE ORAL at 10:52

## 2017-01-01 RX ADMIN — OXYBUTYNIN CHLORIDE 5 MG: 5 TABLET ORAL at 09:13

## 2017-01-01 RX ADMIN — BUMETANIDE 0.5 MG: 0.25 INJECTION, SOLUTION INTRAMUSCULAR; INTRAVENOUS at 19:29

## 2017-01-01 RX ADMIN — HYDRALAZINE HYDROCHLORIDE 100 MG: 50 TABLET, FILM COATED ORAL at 16:18

## 2017-01-01 RX ADMIN — ATORVASTATIN CALCIUM 40 MG: 40 TABLET, FILM COATED ORAL at 20:45

## 2017-01-01 RX ADMIN — DIGOXIN 125 MCG: 0.12 TABLET ORAL at 09:14

## 2017-01-01 RX ADMIN — CLONIDINE HYDROCHLORIDE 0.3 MG: 0.2 TABLET ORAL at 02:42

## 2017-01-01 RX ADMIN — INSULIN LISPRO 4 UNITS: 100 INJECTION, SOLUTION INTRAVENOUS; SUBCUTANEOUS at 13:40

## 2017-01-01 RX ADMIN — OXYBUTYNIN CHLORIDE 5 MG: 5 TABLET ORAL at 11:06

## 2017-01-01 RX ADMIN — IPRATROPIUM BROMIDE AND ALBUTEROL SULFATE 1 AMPULE: .5; 3 SOLUTION RESPIRATORY (INHALATION) at 17:55

## 2017-01-01 RX ADMIN — INSULIN GLARGINE 32 UNITS: 100 INJECTION, SOLUTION SUBCUTANEOUS at 20:52

## 2017-01-01 RX ADMIN — HYDRALAZINE HYDROCHLORIDE 100 MG: 50 TABLET, FILM COATED ORAL at 11:06

## 2017-01-01 RX ADMIN — INSULIN GLARGINE 30 UNITS: 100 INJECTION, SOLUTION SUBCUTANEOUS at 13:08

## 2017-01-01 RX ADMIN — INSULIN GLARGINE 32 UNITS: 100 INJECTION, SOLUTION SUBCUTANEOUS at 22:10

## 2017-01-01 RX ADMIN — CLONIDINE HYDROCHLORIDE 0.2 MG: 0.2 TABLET ORAL at 11:03

## 2017-01-01 RX ADMIN — DILTIAZEM HYDROCHLORIDE 120 MG: 120 CAPSULE, COATED, EXTENDED RELEASE ORAL at 19:29

## 2017-01-01 RX ADMIN — INSULIN GLARGINE 26 UNITS: 100 INJECTION, SOLUTION SUBCUTANEOUS at 22:27

## 2017-01-01 ASSESSMENT — ENCOUNTER SYMPTOMS
WHEEZING: 0
VOMITING: 0
WHEEZING: 0
EYE DISCHARGE: 0
SHORTNESS OF BREATH: 0
NAUSEA: 0
EYE DISCHARGE: 0
COUGH: 1
VOMITING: 0
BACK PAIN: 0
ABDOMINAL PAIN: 0
EYE REDNESS: 0
NAUSEA: 0
DIARRHEA: 0
SORE THROAT: 0
RHINORRHEA: 0
VOMITING: 0
SHORTNESS OF BREATH: 0
SORE THROAT: 0
COUGH: 0
BACK PAIN: 0
NAUSEA: 0
RIGHT EYE: 0
DOUBLE VISION: 0
LEFT EYE: 0
WHEEZING: 0
EYE DISCHARGE: 0
DIARRHEA: 0
BLURRED VISION: 0
COUGH: 0
SHORTNESS OF BREATH: 1
COUGH: 0
ABDOMINAL PAIN: 0
VOMITING: 0
NAUSEA: 0
CONSTIPATION: 0
ABDOMINAL PAIN: 0
BACK PAIN: 0
WHEEZING: 1

## 2017-01-01 ASSESSMENT — PAIN SCALES - GENERAL
PAINLEVEL_OUTOF10: 7
PAINLEVEL_OUTOF10: 0
PAINLEVEL_OUTOF10: 8
PAINLEVEL_OUTOF10: 0
PAINLEVEL_OUTOF10: 7
PAINLEVEL_OUTOF10: 0
PAINLEVEL_OUTOF10: 3
PAINLEVEL_OUTOF10: 0

## 2017-01-01 ASSESSMENT — PAIN DESCRIPTION - PAIN TYPE: TYPE: CHRONIC PAIN

## 2017-01-01 ASSESSMENT — PAIN DESCRIPTION - ORIENTATION: ORIENTATION: LEFT

## 2017-01-01 ASSESSMENT — PAIN DESCRIPTION - LOCATION: LOCATION: FOOT

## 2017-04-27 PROBLEM — R94.31 ABNORMAL EKG: Status: ACTIVE | Noted: 2017-01-01

## 2017-04-27 PROBLEM — I50.9 CHF (CONGESTIVE HEART FAILURE), NYHA CLASS III (HCC): Status: ACTIVE | Noted: 2017-01-01

## 2017-04-27 PROBLEM — R94.31 ABNORMAL EKG: Status: ACTIVE | Noted: 2017-04-27

## 2017-04-27 PROBLEM — I50.9 CHF (CONGESTIVE HEART FAILURE), NYHA CLASS III (HCC): Status: ACTIVE | Noted: 2017-04-27

## 2017-05-11 PROBLEM — I50.42 CHRONIC COMBINED SYSTOLIC AND DIASTOLIC CHF (CONGESTIVE HEART FAILURE) (HCC): Status: ACTIVE | Noted: 2017-01-01

## 2017-05-11 PROBLEM — I50.42 CHRONIC COMBINED SYSTOLIC AND DIASTOLIC CHF (CONGESTIVE HEART FAILURE) (HCC): Status: ACTIVE | Noted: 2017-05-11

## 2017-05-21 PROBLEM — R41.82 MENTAL STATUS CHANGE: Status: ACTIVE | Noted: 2017-01-01

## 2017-05-21 PROBLEM — R41.82 MENTAL STATUS CHANGE: Status: ACTIVE | Noted: 2017-05-21

## 2017-06-08 PROBLEM — Z95.810 ICD (IMPLANTABLE CARDIOVERTER-DEFIBRILLATOR) IN PLACE: Status: ACTIVE | Noted: 2017-06-08

## 2017-06-08 PROBLEM — Z95.810 ICD (IMPLANTABLE CARDIOVERTER-DEFIBRILLATOR) IN PLACE: Status: ACTIVE | Noted: 2017-01-01

## 2017-09-05 PROBLEM — E11.22 TYPE 2 DIABETES MELLITUS WITH STAGE 3 CHRONIC KIDNEY DISEASE, WITH LONG-TERM CURRENT USE OF INSULIN (HCC): Status: ACTIVE | Noted: 2017-01-01

## 2017-09-05 PROBLEM — N18.30 TYPE 2 DIABETES MELLITUS WITH STAGE 3 CHRONIC KIDNEY DISEASE, WITH LONG-TERM CURRENT USE OF INSULIN (HCC): Status: ACTIVE | Noted: 2017-01-01

## 2017-09-05 PROBLEM — Z79.4 TYPE 2 DIABETES MELLITUS WITH STAGE 3 CHRONIC KIDNEY DISEASE, WITH LONG-TERM CURRENT USE OF INSULIN (HCC): Status: ACTIVE | Noted: 2017-01-01

## 2017-09-05 PROBLEM — E11.22 TYPE 2 DIABETES MELLITUS WITH STAGE 3 CHRONIC KIDNEY DISEASE, WITH LONG-TERM CURRENT USE OF INSULIN (HCC): Status: ACTIVE | Noted: 2017-09-05

## 2017-09-05 PROBLEM — N18.30 TYPE 2 DIABETES MELLITUS WITH STAGE 3 CHRONIC KIDNEY DISEASE, WITH LONG-TERM CURRENT USE OF INSULIN (HCC): Status: ACTIVE | Noted: 2017-09-05

## 2017-09-05 PROBLEM — Z79.4 TYPE 2 DIABETES MELLITUS WITH STAGE 3 CHRONIC KIDNEY DISEASE, WITH LONG-TERM CURRENT USE OF INSULIN (HCC): Status: ACTIVE | Noted: 2017-09-05

## 2017-09-12 PROBLEM — G45.9 TIA (TRANSIENT ISCHEMIC ATTACK): Status: ACTIVE | Noted: 2017-01-01

## 2017-09-12 PROBLEM — G45.9 TIA (TRANSIENT ISCHEMIC ATTACK): Status: ACTIVE | Noted: 2017-09-12

## 2017-10-03 PROBLEM — N18.4 CKD (CHRONIC KIDNEY DISEASE), STAGE IV (HCC): Status: ACTIVE | Noted: 2017-10-03

## 2017-10-03 PROBLEM — N18.4 CKD (CHRONIC KIDNEY DISEASE), STAGE IV (HCC): Status: ACTIVE | Noted: 2017-01-01

## 2017-10-03 NOTE — MR AVS SNAPSHOT
After Visit Summary             Luis Alberto Kim   10/3/2017 8:40 AM   Office Visit    Description:  Male : 1950   Provider:  Richard Mcgowan MD   Department:  [address] [place] [address] and Future Appointments         Below is a list of your follow-up and future appointments. This may not be a complete list as you may have made appointments directly with providers that we are not aware of or your providers may have made some for you. Please call your providers to confirm appointments. It is important to keep your appointments. Please bring your current insurance card, photo ID, co-pay, and all medication bottles to your appointment. If self-pay, payment is expected at the time of service. Your To-Do List     Future Appointments Provider Department Dept Phone    10/9/2017 2:45 PM Michaela Mckeon, [address] [place] [address]    2018 9:00 AM Debbie Jovelin, [address] [place] [phone]    Please arrive 15 minutes prior to appointment time, bring insurance card and photo ID. Please arrive 15 minutes prior to appointment time, bring insurance card and photo ID. Future Orders Complete By Expires    Basic Metabolic Panel [RSL33 Custom]  1/3/2018 (Approximate) 1/3/2018    Ferritin [LAB68 Custom]  1/3/2018 (Approximate) 1/3/2018    Hemoglobin and Hematocrit, Blood [UOA714 Custom]  1/3/2018 (Approximate) 1/3/2018    IRON SATURATION [QYY2963 Custom]  1/3/2018 (Approximate) 1/3/2018    Iron [LAB94 Custom]  1/3/2018 (Approximate) 1/3/2018    Phosphorus [DRP069 Custom]  1/3/2018 (Approximate) 1/3/2018    PTH, Intact [WOF236 Custom]  1/3/2018 (Approximate) 1/3/2018    Vitamin D 25 Hydroxy [GQK415 Custom]  1/3/2018 1/3/2018    Follow-Up    Return in about 3 months (around 1/3/2018).          Information from Your Visit        Department     Name Address Phone Fax     [address] [address] sodium chloride 0.9 % infusion Infuse 50 mLs intravenously continuous for 1 day      Allergies              Nsaids     Tuberculin Tests          Additional Information        Basic Information     Date Of Birth Sex Race Ethnicity Preferred Language Preferred Written Language    1950 Male Black Non-/Non  English English      Problem List as of 10/3/2017  Date Reviewed: 10/3/2017                Complex sleep apnea syndrome    Acute on chronic combined systolic and diastolic congestive heart failure (HCC)    Obesity (BMI 30.0-34. 9)    Hypersomnia    S/P cardiac cath June 25, 2012- patent stent in the OM1, Diffuse Multiple sites of mod to severe lesion( 60 to 70%) in [facility]-mild disease of lAD and LCX-unchanged from previous cath-EF 40%    NSTEMI (non-ST elevated myocardial infarction) June 2012    CAD (coronary artery disease) s/p NSTEMI and PCI of OM1 with 3X28 mm BMS and 2.75X12 mm BMS in may 4 2011 BY Dr. Abrahan Jansen    Cardiomyopathy ischemic -Severe EF 30 to  35 % with repeated echo    Hypertension    Diabetes mellitus ([address]    Hyperlipidemia    CKD (chronic kidney disease), stage IV ([facility])    JACQUELYN (obstructive sleep apnea)    Acute-on-chronic kidney injury ([address]    Ischemic cardiomyopathy    History of stroke with residual deficit    TIA (transient ischemic attack)    Cerebrovascular accident (CVA) ([address]    DARCY (acute kidney injury) ([address]    Type 2 diabetes mellitus with stage 3 chronic kidney disease, with long-term current use of insulin ([address]    Coronary artery disease involving native coronary artery of native heart without angina pectoris    Presence of cardiac pacemaker    Chest pain, atypical    Medtronic single ICD 2017 Ketty    Hypophosphatemia    Leukocytosis    Acute metabolic encephalopathy    History of implantable cardioverter-defibrillator (ICD) placement    Mental status change    Altered mental status    Chronic renal insufficiency

## 2017-10-03 NOTE — PROGRESS NOTES
disease) stage 3, GFR 30-59 ml/min    Complex sleep apnea syndrome    CKD (chronic kidney disease) stage 4, GFR 15-29 ml/min ([facility])    Abnormal EKG with LBBB    CHF (congestive heart failure), NYHA class III (HCC)    Chronic combined systolic and diastolic CHF (congestive heart failure) ([facility])    Mental status change    Altered mental status    Chronic renal insufficiency    Hypophosphatemia    Leukocytosis    Acute metabolic encephalopathy    History of implantable cardioverter-defibrillator (ICD) placement    Medtronic single ICD 2017 Ketty    Presence of cardiac pacemaker    Chest pain, atypical    Coronary artery disease involving native coronary artery of native heart without angina pectoris    Type 2 diabetes mellitus with stage 3 chronic kidney disease, with long-term current use of insulin ([facility])    TIA (transient ischemic attack)    [facility]    DARCY (acute kidney injury) [facility]    Acute-on-chronic kidney injury [facility]    Ischemic cardiomyopathy    History of stroke with residual deficit    JACQUELYN (obstructive sleep apnea)    CKD (chronic kidney disease), stage IV ([facility])       Subjective:   Chief complaint:  Chief Complaint   Patient presents with    Follow-Up from Hospital    Chronic Kidney Disease     Stage 3      HPI:This is a follow up visit for Mr. Frank London who is here today for post hospital discharge follow-up visit. He is a gentleman that was recently hospitalized at [address] [place] [address] for acute kidney injury. He also had a change in mental status which was resolved before discharge. He is doing good today with no specific new complaint or concern. Serum creatinine peaked at 3.4 mg/dL in the hospital.  Now is 2.2 mg/dL. Blood pressure is on the low end of normal here in the office but review of blood pressure at the extended care facility found them to be mostly normal though some of them are slightly high    ROS:Constitutional: negative  Eyes: negative  Ears, nose, mouth, throat, and face: negative  Respiratory: negative  Cardiovascular: positive for lower extremity edema  Gastrointestinal: negative  Genitourinary:negative  Integument/breast: negative  Hematologic/lymphatic: negative  Musculoskeletal:positive for arthralgias, back pain and stiff joints  Neurological: positive for coordination problems, gait problems, memory problems and speech problems  Behavioral/Psych: negative  Endocrine: negative  Allergic/Immunologic: negative  Medications:     Current Outpatient Prescriptions   Medication Sig Dispense Refill    cloNIDine (CATAPRES) 0.2 MG tablet Take 1 tablet by mouth every 8 hours 90 tablet 1    isosorbide mononitrate (IMDUR) 120 MG extended release tablet Take 2 tablets by mouth daily 60 tablet 0    Dextromethorphan-guaiFENesin (ROBITUSSIN DM)  MG/5ML SYRP Take 10 mLs by mouth every 6 hours as needed for Cough       insulin glargine (LANTUS) 100 UNIT/ML injection vial Inject 30 Units into the skin every morning      insulin glargine (LANTUS) 100 UNIT/ML injection vial Inject 32 Units into the skin nightly      hydrALAZINE (APRESOLINE) 100 MG tablet Take 100 mg by mouth 3 times daily  60 tablet     ondansetron (ZOFRAN-ODT) 4 MG disintegrating tablet Take 4 mg by mouth every 8 hours as needed for Nausea or Vomiting      calcitRIOL (ROCALTROL) 0.25 MCG capsule Take 0.25 mcg by mouth daily      acetaminophen (TYLENOL) 325 MG tablet Take 650 mg by mouth daily      glucagon, rDNA, 1 MG injection Inject 1 mg into the muscle as needed for Low blood sugar       glucose (GLUTOSE 15) 40 % GEL Take 15 g by mouth as needed (low blood sugar)       Calcium Carbonate Antacid (MAALOX PO) Take 1 tablet by mouth every 6 hours as needed       senna-docusate (SENOKOT S) 8.6-50 MG per tablet Take 1 tablet by mouth daily as needed for Constipation      famotidine (PEPCID) 20 MG tablet Take 1 tablet by mouth daily 30 tablet 0    aspirin 325 MG tablet Take 1 tablet by mouth daily 30 tablet 3    rosuvastatin (CRESTOR) 10 MG tablet Take 1 tablet by mouth nightly 30 tablet 3    diltiazem (CARDIZEM CD) 120 MG ER capsule Take 1 capsule by mouth 2 times daily 60 capsule 3    docusate sodium (COLACE) 100 MG capsule Take 1 capsule by mouth as needed      oxybutynin (DITROPAN) 5 MG tablet Take 1 tablet by mouth 3 times daily 90 tablet 3    vitamin D (CHOLECALCIFEROL) 1000 UNIT TABS tablet Take 4,000 Units by mouth daily      albuterol (PROVENTIL HFA;VENTOLIN HFA) 108 (90 BASE) MCG/ACT inhaler Inhale 2 puffs into the lungs every 6 hours as needed for Wheezing      insulin aspart (NOVOLOG) 100 UNIT/ML injection vial Inject 10 Units into the skin every morning With Sliding scale   -200= 3units  -250= 6units  -300= 9units  -350= 12units  -400= 15untis  BS> 400 CALL MD      insulin aspart (NOVOLOG) 100 UNIT/ML injection vial Inject 14 Units into the skin LUNCH AND SUPPER with Sliding Scale  -200= 3units  -250= 6units  -300= 9units  -350= 12units  -400= 15untis  BS> 400 CALL MD      metoprolol (TOPROL XL) 200 MG XL tablet Take 200 mg by mouth daily.  nitroGLYCERIN (NITROSTAT) 0.4 MG SL tablet Place 0.4 mg under the tongue every 5 minutes as needed.  allopurinol (ZYLOPRIM) 100 MG tablet Take 100 mg by mouth daily.  clopidogrel (PLAVIX) 75 MG tablet Take 75 mg by mouth daily.  therapeutic multivitamin-minerals (THERAGRAN-M) tablet Take 1 tablet by mouth daily.  sodium chloride 0.9 % infusion Infuse 50 mLs intravenously continuous for 1 day 1000 mL 0     No current facility-administered medications for this visit.         Lab Results:    CBC:   Lab Results   Component Value Date    WBC 7.2 09/14/2017    HGB 12.7 (L) 09/14/2017    HCT 38.0 (L) 09/14/2017    MCV 96.1 (H) 09/14/2017     09/14/2017     BMP:    Lab Results   Component Value Date     are reactive . Throat is clear  Neck:supple with no thyromegally  Cardiovascular:  S1, S2 without m/r/g  Respiratory:  CTA B without w/r/r  Abdomen: +bs, soft, nt  Ext: + LE edema  Musculoskeletal:Intact  Neuro:Alert and awake with her left upper extremity weakness    Electronically signed by Sharee Miller MD on 10/3/2017 at 8:58 AM

## 2017-10-13 NOTE — ED NOTES
Family at bedside. Patient resting on cot. Updated on plan of care. Denies any further needs. RN will continue to monitor.      Rell Bonilla RN  10/13/17 9927

## 2017-10-13 NOTE — ED NOTES
Lab in room to draw blood. Flu swab sent to lab. Resp regular. Pt sleeping but arousable. Denies needs. Call light in reach.       Brain Trent RN  10/13/17 7088

## 2017-10-13 NOTE — PROGRESS NOTES
Pharmacy Medication History Note      List of current medications patient is taking is complete. Source of information: [facility]    Changes made to medication list:  Medications removed (include reason, ex. therapy complete or physician discontinued):    Medications added/doses adjusted:  · Insulin glargine (evening dose decreased to 26units)  · Acetaminophen (frequency increased to 3 times daily)  · Gabapentin (added)    Other notes (ex. Recent course of antibiotics, Coumadin dosing):       Allergies reviewed      Electronically signed by Alexandra Patricio, PharmD Candidate 2018 on 10/13/2017 at 7:03 PM

## 2017-10-13 NOTE — ED NOTES
Pt to er via EMS. Pt resides at [facility] and ems was called for low pulse ox of 80%. Upon arrival pt SPO2 80%. Placed on 4L NC, SPO2 to 88%. Pt placed on NRB via EMS and SPO2 increased to 94%. Upon arrival to er, pt a & o x 3 but drowsy. Pt follows commands. SPO2 RA 87%. Pt placed on 4 LNC SPO2 ranges from 91% to  95%. Pt denies any new pain. Pt states he does not feel SOB. Resp regular.       Barbara Virgen, RN  10/13/17 7047

## 2017-10-13 NOTE — ED PROVIDER NOTES
[facility]  eMERGENCY dEPARTMENT eNCOUnter          CHIEF COMPLAINT       Chief Complaint   Patient presents with    Shortness of Breath       Nurses Notes reviewed and I agree except as noted in the HPI. HISTORY OF PRESENT ILLNESS    Rajan Gamble is a 69 y.o. male with a past medical history of CAD, CHF, DM, Hypertension, and GERD who presents to the ED via EMS from [facility] for evaluation of shortness of breath. The patient reports having myalgias since this morning with associated shortness of breath. Per nursing home, the patient's oxygen saturations were in the 80's which prompted them to send the patient to the ED for evaluation. The patient does not use any chronic oxygen support. He was placed on a NRB per EMS PTA with SPO2 at 94% and was placed on 4L NC O2 in the ED with SPO2 of 91%. The patient denies chest pain or a cough. His is a DNR-CC and DNI. REVIEW OF SYSTEMS     Review of Systems   Constitutional: Negative for appetite change, chills, fatigue and fever. HENT: Negative for congestion, ear pain, rhinorrhea and sore throat. Eyes: Negative for discharge, redness and visual disturbance. Respiratory: Positive for shortness of breath. Negative for cough and wheezing. Cardiovascular: Negative for chest pain, palpitations and leg swelling. Gastrointestinal: Negative for abdominal pain, diarrhea, nausea and vomiting. Genitourinary: Negative for decreased urine volume, difficulty urinating and dysuria. Musculoskeletal: Positive for myalgias. Negative for arthralgias, back pain, joint swelling and neck pain. Skin: Negative for pallor and rash. Allergic/Immunologic: Negative for environmental allergies. Neurological: Negative for dizziness, syncope, weakness, light-headedness and headaches. Hematological: Negative for adenopathy. Psychiatric/Behavioral: Negative for agitation, confusion, dysphoric mood and suicidal ideas.  The patient is not (TOPROL XL) 200 MG XL tablet Take 200 mg by mouth daily. Associated Diagnoses: Hypertension; CAD (coronary artery disease); Cardiomyopathy ([address] S/P cardiac cath      nitroGLYCERIN (NITROSTAT) 0.4 MG SL tablet Place 0.4 mg under the tongue every 5 minutes as needed. allopurinol (ZYLOPRIM) 100 MG tablet Take 100 mg by mouth daily. clopidogrel (PLAVIX) 75 MG tablet Take 75 mg by mouth daily. therapeutic multivitamin-minerals (THERAGRAN-M) tablet Take 1 tablet by mouth daily. sodium chloride 0.9 % infusion Infuse 50 mLs intravenously continuous for 1 day  Qty: 1000 mL, Refills: 0       !! - Potential duplicate medications found. Please discuss with provider. ALLERGIES     is allergic to nsaids and tuberculin tests. FAMILY HISTORY     indicated that his mother is . He indicated that his father is . He indicated that his sister is . He indicated that his brother is . He indicated that his maternal grandmother is . He indicated that his maternal grandfather is . He indicated that his paternal grandmother is . He indicated that his paternal grandfather is . family history includes Heart Disease in his father and mother. SOCIAL HISTORY      reports that he quit smoking about 10 years ago. His smoking use included Cigarettes. He has a 40.00 pack-year smoking history. He has never used smokeless tobacco. He reports that he does not drink alcohol or use drugs. PHYSICAL EXAM     INITIAL VITALS:  height is 6' (1.829 m) and weight is 228 lb 3.2 oz (103.5 kg). His axillary temperature is 98 °F (36.7 °C). His blood pressure is 147/87 (abnormal) and his pulse is 77. His respiration is 20 and oxygen saturation is 91%. Physical Exam   Constitutional: He is oriented to person, place, and time. He appears well-developed and well-nourished. HENT:   Head: Normocephalic and atraumatic.    Right Ear: External ear normal. Left Ear: External ear normal.   Eyes: Conjunctivae are normal. Right eye exhibits no discharge. Left eye exhibits no discharge. No scleral icterus. Neck: Normal range of motion. Neck supple. No JVD present. Cardiovascular: Normal rate, regular rhythm and normal heart sounds. Exam reveals no gallop and no friction rub. No murmur heard. Pulmonary/Chest: Effort normal and breath sounds normal. No respiratory distress. He has no decreased breath sounds. He has no wheezes. He has no rhonchi. He has no rales. Abdominal: Soft. He exhibits no distension. There is no tenderness. There is no rebound and no guarding. Musculoskeletal: Normal range of motion. He exhibits no edema. Neurological: He is alert and oriented to person, place, and time. He exhibits normal muscle tone. He displays no seizure activity. GCS eye subscore is 4. GCS verbal subscore is 5. GCS motor subscore is 6. Skin: Skin is warm and dry. No rash noted. He is not diaphoretic. Psychiatric: He has a normal mood and affect. His behavior is normal. Thought content normal.   Nursing note and vitals reviewed. DIFFERENTIAL DIAGNOSIS:   CHF exacerbation, Pneumonia, ACS     DIAGNOSTIC RESULTS     EKG: All EKG's are interpreted by the Emergency Department Physician who either signs or Co-signs this chart in the absence of a cardiologist.  EKG interpreted by Jie Conde MD:    Vent. Rate: 77 bpm  AL interval: 170 ms  QRS duration: 124 ms  QTc: 434 ms  P-R-T axes: 11, -13, 229  Sinus rhythm with occasional PVCs. LBBB. No STEMI      RADIOLOGY: non-plain film images(s) such as CT, Ultrasound and MRI are read by the radiologist.    XR CHEST STANDARD (2 VW)   Final Result   1. Mild central vascular congestion and mild interstitial pulmonary edema. **This report has been created using voice recognition software. It may contain minor errors which are inherent in voice recognition technology. **      Final report electronically Hemoglobin 11.2 (*)     Hematocrit 35.2 (*)     MCV 98.3 (*)     MCH 31.4 (*)     MCHC 32.0 (*)     RDW 19.8 (*)     All other components within normal limits   RAPID INFLUENZA A/B ANTIGENS   CULTURE BLOOD #1   CULTURE BLOOD #2   ANION GAP   OSMOLALITY   APTT   MAGNESIUM   COMPREHENSIVE METABOLIC PANEL   LACTIC ACID, PLASMA   TSH WITHOUT REFLEX   T4, FREE   TROPONIN   TROPONIN   TROPONIN   CBC WITH AUTO DIFFERENTIAL   PROTIME-INR   CBC   PHOSPHORUS   MRSA BY PCR   APTT       EMERGENCY DEPARTMENT COURSE:   Vitals:    Vitals:    10/13/17 1332 10/13/17 1334 10/13/17 1456   BP: 100/76  112/70   Pulse: 77  74   Resp: 16  16   Temp: 98 °F (36.7 °C)     TempSrc: Oral     SpO2: (!) 87% 93% 94%   Weight: 242 lb (109.8 kg)     Height: 6' (1.829 m)         2:32 PM: The patient was seen and evaluated. MDM:  Patient presented to the emergency with hypoxia. Has multiple medical problems and history of coronary artery disease, CK D, CHF. Patient was satting 90-91% on 4 L. Does not use oxygen at the nursing home. Labwork was done which showed elevated white blood cell count, elevated to protocol, blood cultures pending, elevated troponin higher than his baseline, highly elevated BNP, elevated d-dimer. CK D at baseline. All other lab work as mentioned above. The his chest x-ray showed pulmonary congestion. And discussed case with Dr. gómez who agreed to give the patient Lasix. Patient was also covered by antibiotics. Decision was made to send the patient on heparin low dose given his elevated troponin and the abnormal d-dimer. This with the family the plan of care and findings. They agreed to start the patient on heparin and patient also agreed to that. I called and discussed case with Dr. Ita Fontanez hospitalist on-call agreed to admit the patient. CRITICAL CARE:   None      CONSULTS:  None     PROCEDURES:  None      FINAL IMPRESSION      1. Hypoxia    2.  Acute congestive heart failure, unspecified

## 2017-10-13 NOTE — ED NOTES
Pt resting on cot. Significant other at bedside. Updated on plan of care. Denies any further needs. RN will continue to monitor.       Rell Bonilla RN  10/13/17 3683

## 2017-10-14 NOTE — PROGRESS NOTES
Patient arrived per cart to 3B36. Heart monitor applied and vitals taken. Admission paperwork completed. Explained to patient that [facility] is not responsible for any lost or stolen items. Patient verbalized understanding. Oriented to room and use of call light and bed controls. Patient denies pain or needs. No signs of distress noted. Bed locked & in low position, side-rails up x2. Call light in reach. Reminded patient to call nurse if any needs arise. 2226: Dr. Andrez Mota called regarding questions about the patient. Told Dr. Andrez Mota patient had echo in March and asked if he wanted another one. He said yes. The nurse also asked him if the patient could take breaks while on the BIPAP since the patient has not eaten all day. He said wait until morning. Also told him about the d-dimer and was wondering if he wanted a CT Chest. He said to order a VQ scan for the morning. Creatinine 3.0 The nurse asked if nephrology should be consulted. He said his creatinine is close to his baseline so we do not need to consult nephrology. Orders placed.

## 2017-10-14 NOTE — PROGRESS NOTES
[address] [address]  SPEECH THERAPY  [facility] CCU-STEPDOWN 3B  Bedside Swallowing Evaluation      SLP Individual Minutes  Time In: 1500  Time Out: 9146  Minutes: 15  Timed Code Treatment Minutes: 0 Minutes       Date: 10/14/2017  Patient Name: Jhon Restrepo      CSN: [id number]   : 1950  (69 y.o.)  Gender: male   Referring Physician:  Dr. Luis Mobley MD  Diagnosis: NSTEMI  Secondary Diagnosis:  Dysphagia     History of Present Illness/Injury: Pt admitted to [facility] with above dx. See physician H&P for full report. RN reports choking episode this date during breakfast tray. No other s/s of aspiration noted by RN. ST consulted to complete BSE, evaluate swallow function, determine least restrictive diet and develop goals and POC.     Past Medical History:   Diagnosis Date    Blood circulation, collateral     PAD    CAD (coronary artery disease)     MI    Chest pain     CHF (congestive heart failure) (HCC)     Convulsion (HCC)     Diabetes mellitus ([address]     Dysphagia as late effect of cerebrovascular disease     GERD (gastroesophageal reflux disease)     Gout     Hyperlipidemia     Hypertension     Medtronic single ICD 2017 Ketty 2017    Morbid obesity ([address]     Myocardial infarction     Painful bladder spasm 2016    Polycystic kidney, unspecified type     Prostate cancer ([address]     Renal insufficiency     Sleep apnea     Speech and language deficit as late effect of stroke     Stroke ([address]     massive stroke- years ago  Lt sided hemiplegia 8-9 years ago       Pain: Did not report     Current Diet: Regular/thin     Respiratory Status: [x] Independent [] Nasal Cannula [] Oxygen Mask      [] Tracheostomy [] Other:     [] Ventilator/Settings:    Behavioral Observation: [x] Alert [x] Oriented [] Confused [] Lethargic      [] Dysarthric [] Limited Direction Following [] Agitated      [x] Other: Slow processing speed     ORAL MECHANISM EVALUATION:         Comments:  Facial / Labial

## 2017-10-14 NOTE — PROGRESS NOTES
interstitial pulmonary edema. **This report has been created using voice recognition software. It may contain minor errors which are inherent in voice recognition technology. ** Final report electronically signed by Dr. Tia Osborn on 10/13/2017 2:40 PM        Physical Exam:  Vitals: /81   Pulse 62   Temp 98.1 °F (36.7 °C) (Oral)   Resp 20   Ht 6' (1.829 m)   Wt 229 lb 6.4 oz (104.1 kg)   SpO2 90%   BMI 31.11 kg/m²   24 hour intake/output:  Intake/Output Summary (Last 24 hours) at 10/14/17 1234  Last data filed at 10/14/17 0942   Gross per 24 hour   Intake              310 ml   Output              150 ml   Net              160 ml     Last 3 weights: Wt Readings from Last 3 Encounters:   10/14/17 229 lb 6.4 oz (104.1 kg)   09/14/17 226 lb (102.5 kg)   09/07/17 230 lb (104.3 kg)       General appearance - alert, awake appears to be in no acute distress  HEENT: Atraumatic normocephalic, no JVD. Trachea midline.    Chest - Bilateral rhonchi  Cardiovascular - S1S2 RRR, no murmurs or gallops  Abdomen - Soft non tender non distended, normoactive bowel sounds   Neurological - non focal, no neurological deficits noted  Integumentary - Skin color, texture, turgor normal. No Rashes or lesions  Musculoskeletal -Full ROM, No clubbing or cyanosis  Extremities: No peripheral edema    DVT prophylaxis: [] Lovenox                                 [] SCDs                                 [] SQ Heparin                                 [] Encourage ambulation           [x] Already on Anticoagulation               Electronically signed by Pattie Vazquez MD on 10/14/2017 at 12:34 PM    [facility]  [phone]

## 2017-10-15 NOTE — H&P
back  pain, joint swelling, neck pain or neck stiffness. NEUROLOGIC:  Negative for dizziness, syncope, weakness,  lightheadedness or headaches or vertigo. No focal neurologic signs  indicative of stroke. All other systems are negative except as mentioned above or in HPI. PAST MEDICAL HISTORY:  1.  Massive stroke years ago which left his left side weak. 2.  Speech and language deficit as a late effect of stroke. 3.  Sleep apnea. 4.  Renal insufficiency. 5.  Prostate cancer. 6.  Polycystic kidney. 7.  Painful bladder spasms. 8.  Myocardial infarction. 9.  Morbid obesity. 10.  [employer] single ICD in 2017. 11. Hypertension. 12.  Hyperlipidemia. 13.  Gout. 14.  Gastroesophageal reflux disease. 15.  Dysphagia, late effects of cerebrovascular disease. 16.  Diabetes mellitus. 17.  Convulsions. 18.  Congestive heart failure. 19.  Chest pain. 20.  Coronary artery disease. PAST SURGICAL HISTORY:  He has had transthoracic echocardiograms  several times, the latest one in 2013. Pacemaker placement. Colonoscopy. Diagnostic cardiac lab procedure, successful  percutaneous intervention of the right external iliac artery,  successful insertion of a stent in the right external iliac artery,  significant _____ disease in both limbs. Cardiac catheterization. He  has coronary intervention on the obtuse marginal 1, critical lesion of  the right external iliac artery. Coronaries have diffuse three-vessel  disease _____ anterior apical hypokinesis. EF is 40% and severe iliac  disease. Appendectomy. Stress Lexiscan done in June of this year shows the patient did not  have any chest pain during stress. No chest pain during stress. No  stress-induced arrhythmia. Moderate size, moderately severe fixed  myocardial perfusion defect of the anterolateral wall suggestive of an  old transmural infarct.   There is a small, moderately-sized, mild  superiorly, partly reversible myocardial defect in the inferior and  anterior walls. It is suggestive of luke-infarct ischemia in the  anterior inferior walls and that was in June of this year. In March of this year, he had a full echocardiogram done which showed  left ventricular size is normal and normal left ventricular wall  thickness. Severe global hypokinesis of the left ventricle. Systolic  function is severely reduced in the range of 30 to 35% ejection  fraction. Left atrium is mild to moderately dilated. Aortic valves  are tricuspid and mild to moderately calcified. Leaflets exhibit  mildly increased thickness and reduced cuspal separation of the aortic  valve. No evidence of aortic regurgitation or stenosis. That was in  03/2017. PAST FAMILY MEDICAL HISTORY:  Heart disease. SOCIAL HISTORY:  He is a former smoker, quit in 2007, but smoked a  pack a day for 40 years. Never used smokeless tobacco.  Does not  drink alcohol. Does not use any drugs of abuse. ALLERGIES:  Tuberculin tests give an unknown reaction and NSAIDs give  an unknown reaction. HOME MEDICATIONS:  1. Tylenol 650 mg three times daily as needed for pain. 2.  Albuterol HFA, inhaled 2 puffs in the lungs every 6 hours as  needed for wheezing. 3.  Allopurinol or Zyloprim 100 mg by mouth daily. 4.  Aspirin 325 mg by mouth daily. 5.  Calcitriol or Rocaltrol 0.25 mcg by mouth daily. 6.  Calcium carbonate or antacid Maalox 1 tablet by mouth every 6  hours as needed. 7.  Clonidine or Catapres 0.2 mg by mouth every 8 hours. 8.  Clopidogrel or Plavix 75 mg by mouth. 9.  Dextromethorphan or guaifenesin. 10.  Robitussin-DM takes 10 mL by mouth every 6 hours as needed for  cough. 11.  Cardizem or diltiazem  mg extended release tablet one by  mouth two times daily. 12.  Docusate sodium 100 mg by mouth daily. 13.  Famotidine or Pepcid 20 mg by mouth daily. 14.  Gabapentin 300 mg by mouth daily.   15.  Glucagon 1 mg injection in the muscle as needed for low blood  pressure. 16.  Glucose 40% gel, takes 15 g by mouth as needed for low blood  sugar. 17.  Hydralazine 100 mg by mouth three times daily. 18.  He is on insulin aspart sliding scale. 19.  Insulin glargine or Lantus insulin 30 units in the skin every  morning. 20.  Insulin glargine or Lantus insulin 26 units in the skin nightly. 21.  Isosorbide mononitrate or Imdur 120 mg extended release tablet,  takes 2 tablets by mouth daily. 22.  Metoprolol  mg, takes 200 mg by mouth daily. 23.  Nitroglycerin or Nitrostat 0.4 mg under the tongue every 5  minutes as needed for three doses. 24.  Ondansetron 4 mg disintegrating tablets every 8 hours as needed  for nausea or vomiting. 25.  Oxybutynin or Ditropan 5 mg by mouth three times daily. 26.  Rosuvastatin or Crestor 10 mg by mouth nightly. 27.  Senna and docusate or Senokot 1 tablet by mouth daily as needed  for constipation. 28.  He is on sodium chloride infusion 50 mL intravenously continuous  for 1 day. 29.  He is on therapeutic multivitamins and minerals 1 tablet by mouth  daily. 30.  He is on vitamin D, cholecalciferol 1000 units tabs. PHYSICAL EXAMINATION:  VITAL SIGNS:  Blood pressure is 140/96, pulse is 68-77, normal sinus  rhythm, regular rhythm. Respirations are 20. He is saturating 91 to  95% to 100% on BiPAP set at 18/6 for IPAP, EPAP. He was at 87% on  room air and 93 to 94% on nasal cannula. Temperature was 97.5 degrees  Fahrenheit with 98.0 degrees Fahrenheit being the highest he had. He  weighs 103.5 kg. He is 60 inches tall. GENERAL:  Alert and oriented x3. Well-developed, well-nourished, and  obese. No acute distress and breathing better on the mask he says. Otherwise, no acute distress at the moment. HEENT:  Normocephalic, atraumatic. Pupils equal and reactive to light  and accommodation. Extraocular muscles are intact. Sclerae  nonicteric and clear. NECK:  Supple. Free range of motion.   No meningismus or nuchal  rigidity. No vertebral tenderness. JVD is present at 1-2 cm above  the clavicles with the bed at 3-degree angle. Carotid pulses present. No bruits present. Trachea midline and mobile. No thyromegaly noted. CHEST:  Somewhat barrel-chested. No other deformities or trauma, but  there is an AICD in the left upper chest in the subcu pocket. Equal  excursion, bilateral respiratory effort, on the mask. No use of  accessory muscles of respiration on the BiPAP mask. No respiratory  distress recurrent on the BiPAP mask. LUNGS:  Some coarse rhonchi throughout with no rales, wheezing or  crackles. HEART:  The PMI is displaced to the left midclavicular line and  somewhat enlarged. Heart sounds somewhat distant due to COPD. Normal  S1. Normal S2. Faint systolic ejection murmur in upper right sternal  border. No S3. No S4. No murmurs, gallops, rubs or clicks. ABDOMEN:  Soft. Nondistended. Nontender. No peritoneal signs. Obese. No masses felt. Bowel sounds are hypoactive in all four  quadrants. BACK:  No spinal tenderness to percussion. No CVA tenderness  bilaterally to percussion. EXTREMITIES:  No cyanosis, clubbing or edema. Radial pulses are 4+/4+  bilaterally. Pedal pulses are 1+/4+ bilaterally. No pedal edema. No  ankle edema. Some venous stasis changes in the skin though. No calf  tenderness. Homans sign negative. No evidence of DVT. NEURO:  Alert and oriented x3. GCS 15. Cranial nerves II through XII  are intact. Wearing a BiPAP mask and breathing pretty well with it. Motor is 5/5 on the right side and the left leg, but only 2-3/5 on the  left arm. Sensory exam is symmetrical and within normal limits. SKIN:  Warm and dry with no rash and nondiaphoretic. No lesions. PSYCHIATRIC:  Normal mood and affect.   Behavior and thought content  are normal.    EKG:  EKG shows sinus rhythm with sinus arrhythmia with occasional  premature ventricular complexes, though I do not see those.  I just  see premature AC or atrial PACs. I do not see premature ventricles  myself. Left bundle-branch block present. This is not new. There is  some ST or T-wave changes associated with LVH or left bundle-branch  block. No other acute ischemic changes though. Standard two-view chest x-ray:  Heart size remains mildly enlarged. Left AICD cardiac unit is unchanged in position. tion. Mild central  vascular congestion. Mild interstitial prominence in both lungs. No  dense consolidation or pneumothorax. Mediastinum is not widened. There is mild central vascular congestion and mild interstitial  pulmonary edema. LABORATORY DATA:  Sodium 142. Potassium 4.9. Chloride 107. Bicarb  24. BUN is up at 35. Creatinine is way up at 3.0. Estimated GFR of  25. Anion gap 11.0.  Glucose 101. Calcium 8.8. Osmolality 291.2. Procalcitonin is high at 0.32. Troponin T is 0.107 and it is 0.098,  the second one. CBC showed white blood cell count of 11.3, which is  elevated and a left shift somewhat of 69.0% neutrophils. H and H is  11.3 and 34.8 which are both down. RBC indices show increase with MCV  of 97.2, MCH of 31.5, and MCHC down at 32.4. RDW is up at 19.9, there  is 1+ anisocytosis. They do not mention anything about macrocytes in  the RBC morphology. Platelet count is 313. D-dimer is quantitatively  1301.00. Flu A antigen and flu B antigen are negative. APTT is 31.8. ASSESSMENT AND PLAN:  1. Shortness of breath. Possibly due to CHF with a high BNP and some  pulmonary edema noted on x-ray of the chest.  Continue his Lasix, but  give him some Bumex 0.5 mg IV two times daily. 2.  Non-ST elevation MI. He had left bundle-branch block on EKG, so  it was hard to determine if there is any ST elevation or not related  to heart attack but the LBBB is not new. 3.  Elevated troponins. Have him on aspirin 325 mg, which he is on. Continue his clopidogrel or Plavix.   Continue his digoxin 125 mcg  orally daily. Continue his metoprolol succinate. Put him on heparin  low-dose drip. 4.  Gout. Continue his allopurinol. 5.  Hyperlipidemia. Continue his atorvastatin or Lipitor. 6.  Secondary hyperparathyroid. He is on calcitriol, continue that. 7.  Blood pressure, appears to be that he has had some problems with  that because he is on a number of blood pressure medications like  clonidine, hydralazine, metoprolol succinate, which will help and  losartan. 8.  Continue his isosorbide mononitrate or Imdur for his heart. 9.  Continue his albuterol DuoNebs every 4 hours while awake for his  COPD. 10.  Continue Neurontin for his neuropathy. 11.  GI prophylaxis with famotidine 20 mg daily. 12. DVT prophylaxis _____ taken care of by the heparin drip being  used for the heart. 13.  Must have some tendency for atrial fibrillation because he is on  Cardizem CD. Keep that going. 14.  His elevated troponins are seem to have plateaued out and not  dropped, so it may be related to his kidney function, but we will  follow him anyway and see what happens. I spent 70 minutes evaluating and managing the patient including  documentation, review of records, and examination of the patient. This 70 minutes hospitalist time not including time spent performing  procedures as none were performed.         Marti Scruggs M.D.    D: 10/14/2017 0:31:01       T: 10/14/2017 0:42:42     [email or web address]  Job#: [id number]     Doc#: [id number]

## 2017-10-15 NOTE — PROGRESS NOTES
well.  Patient seen and examined at bedside, still with cough. No new complaints or acute overnight events. All other ROS negative except noted in HPI    Past, Family, Social History unchanged from admission.     Diet:  DIET CARDIAC;    Medications:  Scheduled Meds:   insulin lispro  0-12 Units Subcutaneous TID WC    insulin lispro  0-6 Units Subcutaneous Nightly    ipratropium-albuterol  1 ampule Inhalation Q4H WA    allopurinol  100 mg Oral Daily    aspirin  325 mg Oral Daily    calcitRIOL  0.25 mcg Oral Daily    cloNIDine  0.2 mg Oral Q8H    clopidogrel  75 mg Oral Daily    diltiazem  120 mg Oral BID    famotidine  20 mg Oral Daily    gabapentin  300 mg Oral TID    hydrALAZINE  100 mg Oral TID    insulin glargine  30 Units Subcutaneous QAM    insulin glargine  26 Units Subcutaneous Nightly    isosorbide mononitrate  240 mg Oral Daily    metoprolol succinate  200 mg Oral Daily    oxybutynin  5 mg Oral TID    atorvastatin  40 mg Oral Nightly    vitamin D  4,000 Units Oral Daily    sodium chloride flush  10 mL Intravenous 2 times per day    losartan  25 mg Oral Daily    bumetanide  0.5 mg Intravenous BID    digoxin  125 mcg Oral Daily    phosphorus replacement protocol   Other RX Placeholder    potassium (CARDIAC) replacement protocol   Other RX Placeholder     Continuous Infusions:   dextrose      heparin (porcine) 14 Units/kg/hr (10/15/17 0226)     PRN Meds:acetaminophen, glucose, dextrose, glucagon (rDNA), dextrose, albuterol sulfate HFA, guaiFENesin-dextromethorphan, docusate sodium, nitroGLYCERIN, sennosides-docusate sodium, sodium chloride flush, magnesium sulfate, magnesium hydroxide, ondansetron, HYDROcodone 5 mg - acetaminophen **OR** HYDROcodone 5 mg - acetaminophen, heparin (porcine), heparin (porcine)    Objective:  CBC:   Recent Labs      10/13/17   1457  10/13/17   1900  10/14/17   0553  10/15/17   0101   WBC  11.3*  12.8*  10.2   --    HGB  11.3*  11.2*  11.1*   --    PLT 179  177  168  164     BMP:    Recent Labs      10/13/17   1457  10/14/17   0553   NA  142  142   K  4.9  4.9   CL  107  106   CO2  24  23   BUN  35*  36*   CREATININE  3.0*  2.6*   GLUCOSE  101  87     Calcium:  Recent Labs      10/14/17   0553   CALCIUM  8.8     Ionized Calcium:No results for input(s): IONCA in the last 72 hours. Magnesium:  Recent Labs      10/14/17   0553   MG  2.1     Phosphorus:  Recent Labs      10/14/17   0553   PHOS  4.0     BNP:No results for input(s): BNP in the last 72 hours. Glucose:  Recent Labs      10/14/17   2002  10/15/17   0740  10/15/17   1146   POCGLU  152*  150*  122*     HgbA1C: No results for input(s): LABA1C in the last 72 hours. INR:   Recent Labs      10/14/17   0553   INR  1.26*     Hepatic:   Recent Labs      10/14/17   0553   ALKPHOS  77   ALT  13   AST  16   PROT  6.9   BILITOT  0.4   LABALBU  3.0*     Amylase and Lipase:  Recent Labs      10/14/17   0553   LACTA  0.8     Lactic Acid:   Recent Labs      10/14/17   0553   LACTA  0.8     Troponin:   Recent Labs      10/13/17   2200  10/14/17   0135  10/14/17   0553   TROPONINT  0.075*  0.072*  0.068*     BNP: No results for input(s): BNP in the last 72 hours. Lipids: No results for input(s): CHOL, TRIG, HDL, LDLCALC in the last 72 hours. Invalid input(s): LDL  ABGs:   Lab Results   Component Value Date    PH 7.33 10/13/2017    PCO2 43 10/13/2017    PO2 171 10/13/2017    HCO3 23 10/13/2017    O2SAT 99 10/13/2017           Radiology reports as per the Radiologist  Radiology: Xr Chest Standard (2 Vw)    Result Date: 10/13/2017  PROCEDURE: XR CHEST STANDARD TWO VW CLINICAL INFORMATION: sob, . COMPARISON: [facility] x-ray from September 11, 2017, September 7, 2017, and June 23, 2017. TECHNIQUE: PA and lateral views the chest. FINDINGS: The heart size remains mildly enlarged. The left side AICD cardiac unit is unchanged in position. There is mild central vascular congestion. There is mild interstitial prominence of both lungs. No dense consolidation. No pneumothorax. The mediastinum is not widened. 1.   Mild central vascular congestion and mild interstitial pulmonary edema. **This report has been created using voice recognition software. It may contain minor errors which are inherent in voice recognition technology. ** Final report electronically signed by Dr. Imelda Guerrero on 10/13/2017 2:40 PM        Physical Exam:  Vitals: /85   Pulse 75   Temp 98.7 °F (37.1 °C) (Oral)   Resp 22   Ht 6' (1.829 m)   Wt 223 lb 9 oz (101.4 kg)   SpO2 99%   BMI 30.32 kg/m²   24 hour intake/output:    Intake/Output Summary (Last 24 hours) at 10/15/17 1241  Last data filed at 10/15/17 0427   Gross per 24 hour   Intake           630.45 ml   Output             1200 ml   Net          -569.55 ml     Last 3 weights: Wt Readings from Last 3 Encounters:   10/15/17 223 lb 9 oz (101.4 kg)   09/14/17 226 lb (102.5 kg)   09/07/17 230 lb (104.3 kg)       General appearance - alert, awake appears to be in no acute distress  HEENT: Atraumatic normocephalic, no JVD. Trachea midline.    Chest - Bilateral rhonchi  Cardiovascular - S1S2 RRR, no murmurs or gallops  Abdomen - Soft non tender non distended, normoactive bowel sounds   Neurological - non focal, no neurological deficits noted  Integumentary - Skin color, texture, turgor normal. No Rashes or lesions  Musculoskeletal -Full ROM, No clubbing or cyanosis  Extremities: No peripheral edema    DVT prophylaxis: [] Lovenox                                 [] SCDs                                 [] SQ Heparin                                 [] Encourage ambulation           [x] Already on Anticoagulation               Electronically signed by Luis Mobley MD on 10/15/2017 at 12:41 PM    [facility] Hospitalist  [phone]

## 2017-10-16 NOTE — DISCHARGE SUMMARY
scan). 2. Ventilation findings of chronic lung disease. Final report electronically signed by Dr. Kelley Brunner on 10/16/2017 10:09 AM    Xr Chest Portable    Result Date: 10/15/2017  PROCEDURE: XR CHEST PORTABLE CLINICAL INFORMATION: Cough TECHNIQUE: Mobile AP chest radiograph. COMPARISON: AP and lateral chest radiographs 10/13/2017 FINDINGS: A left-sided cardiac device is in stable position. Cardiac silhouette is at the upper limits of normal in size or mildly enlarged. Pulmonary interstitium remains prominent. There are indistinct reticular opacities at the right lung base. Degenerative changes are present in the thoracic spine. 1. Right basilar atelectasis/infiltrate. 2. Pulmonary edema. **This report has been created using voice recognition software. It may contain minor errors which are inherent in voice recognition technology. ** Final report electronically signed by Dr. Kelley Brunner on 10/15/2017 3:05 PM      Results for orders placed or performed during the hospital encounter of 10/13/17   Rapid influenza A/B antigens   Result Value Ref Range    Flu A Antigen NEGATIVE NEGATIVE    Flu B Antigen NEGATIVE NEGATIVE   Culture blood #1   Result Value Ref Range    Blood Culture, Routine No growth-preliminary    Culture blood #2   Result Value Ref Range    Blood Culture, Routine No growth-preliminary    CBC auto differential   Result Value Ref Range    WBC 11.3 (H) 4.8 - 10.8 thou/mm3    RBC 3.58 (L) 4.70 - 6.10 mill/mm3    Hemoglobin 11.3 (L) 14.0 - 18.0 gm/dl    Hematocrit 34.8 (L) 42.0 - 52.0 %    MCV 97.2 (H) 80.0 - 94.0 fL    MCH 31.5 (H) 27.0 - 31.0 pg    MCHC 32.4 (L) 33.0 - 37.0 gm/dl    RDW 19.9 (H) 11.5 - 14.5 %    Platelets 452 365 - 064 thou/mm3    MPV 8.0 7.4 - 10.4 mcm    RBC Morphology NORMAL     Seg Neutrophils 69.0 %    Lymphocytes 18.7 %    Monocytes 11.9 %    Eosinophils 0.1 %    Basophils 0.3 %    nRBC 0 /100 wbc    Anisocytosis 1+     Segs Absolute 7.8 (H) 1.8 - 7.7 thou/mm3 Lymphocytes # 2.1 1.0 - 4.8 thou/mm3    Monocytes # 1.3 0.4 - 1.3 thou/mm3    Eosinophils # 0.0 0.0 - 0.4 thou/mm3    Basophils # 0.0 0.0 - 0.1 thou/mm3   Basic Metabolic Panel   Result Value Ref Range    Sodium 142 135 - 145 meq/L    Potassium 4.9 3.5 - 5.2 meq/L    Chloride 107 98 - 111 meq/L    CO2 24 23 - 33 meq/L    Glucose 101 70 - 108 mg/dL    BUN 35 (H) 7 - 22 mg/dL    CREATININE 3.0 (HH) 0.4 - 1.2 mg/dL    Calcium 8.8 8.5 - 10.5 mg/dL   Troponin   Result Value Ref Range    Troponin T 0.107 (A) ng/ml   Brain Natriuretic Peptide   Result Value Ref Range    Pro-BNP 28882.0 (H) 0.0 - 900.0 pg/mL   Blood Gas, Arterial   Result Value Ref Range    pH, Blood Gas 7.28 (L) 7.35 - 7.45    PCO2 56 (H) 35 - 45 mmhg    PO2 70 (L) 71 - 104 mmhg    HCO3 26 23 - 28 mmol/l    Base Excess (Calculated) -1.1 -2.5 - 2.5 mmol/l    O2 Sat 91 %    DEVICE Cannula     Tab Test N/A     Source: R Brach     COLLECTED BY: [id number]    D-dimer, quantitative   Result Value Ref Range    D-Dimer, Quant 1301.00 (H) 0.00 - 500.0 ng/ml FEU   Anion Gap   Result Value Ref Range    Anion Gap 11.0 8.0 - 16.0 meq/L   Glomerular Filtration Rate, Estimated   Result Value Ref Range    Est, Glom Filt Rate 25 (A) ml/min/1.73m2   Osmolality   Result Value Ref Range    Osmolality Calc 291.2 275.0 - 300 mOsmol/kg   Blood Gas, Arterial   Result Value Ref Range    pH, Blood Gas 7.29 (L) 7.35 - 7.45    PCO2 56 (H) 35 - 45 mmhg    PO2 175 (H) 71 - 104 mmhg    HCO3 27 23 - 28 mmol/l    Base Excess (Calculated) -0.5 -2.5 - 2.5 mmol/l    O2 Sat 99 %    IFIO2 50     DEVICE BiPAP     Mode CPAP/PS     SET PEEP 6.0 mmhg    SET PRESS SUPP 8 cmH2O    Tab Test N/A     Source: R Brach     COLLECTED BY: [id number]    Procalcitonin   Result Value Ref Range    Procalcitonin 0.32 (H) 0.01 - 0.09 ng/mL   Blood Gas, Arterial   Result Value Ref Range    pH, Blood Gas 7.33 (L) 7.35 - 7.45    PCO2 43 35 - 45 mmhg    PO2 171 (H) 71 - 104 mmhg    HCO3 23 23 - 28 mmol/l    Base Excess 11.5 - 14.5 %    Platelets 413 852 - 703 thou/mm3    MPV 8.3 7.4 - 10.4 mcm    RBC Morphology NORMAL     Seg Neutrophils 69.5 %    Lymphocytes 17.3 %    Monocytes 11.7 %    Eosinophils 0.7 %    Basophils 0.8 %    nRBC 2 /100 wbc    Anisocytosis 1+     Segs Absolute 7.1 1.8 - 7.7 thou/mm3    Lymphocytes # 1.8 1.0 - 4.8 thou/mm3    Monocytes # 1.2 0.4 - 1.3 thou/mm3    Eosinophils # 0.1 0.0 - 0.4 thou/mm3    Basophils # 0.1 0.0 - 0.1 thou/mm3   Protime-INR   Result Value Ref Range    INR 1.26 (H) 0.85 - 1.13   Phosphorus   Result Value Ref Range    Phosphorus 4.0 2.4 - 4.7 mg/dL   APTT   Result Value Ref Range    aPTT 46.6 (H) 22.0 - 38.0 seconds   Troponin   Result Value Ref Range    Troponin T 0.068 (A) ng/ml   Anion Gap   Result Value Ref Range    Anion Gap 13.0 8.0 - 16.0 meq/L   Glomerular Filtration Rate, Estimated   Result Value Ref Range    Est, Glom Filt Rate 30 (A) ml/min/1.73m2   APTT   Result Value Ref Range    aPTT 61.7 (H) 22.0 - 38.0 seconds   APTT   Result Value Ref Range    aPTT 54.6 (H) 22.0 - 38.0 seconds   Platelet count   Result Value Ref Range    Platelets 943 274 - 368 thou/mm3   APTT   Result Value Ref Range    aPTT 70.3 (H) 22.0 - 38.0 seconds   Basic Metabolic Panel   Result Value Ref Range    Sodium 142 135 - 145 meq/L    Potassium 4.6 3.5 - 5.2 meq/L    Chloride 104 98 - 111 meq/L    CO2 23 23 - 33 meq/L    Glucose 154 (H) 70 - 108 mg/dL    BUN 33 (H) 7 - 22 mg/dL    CREATININE 2.6 (H) 0.4 - 1.2 mg/dL    Calcium 9.0 8.5 - 10.5 mg/dL   CBC auto differential   Result Value Ref Range    WBC 8.0 4.8 - 10.8 thou/mm3    RBC 3.45 (L) 4.70 - 6.10 mill/mm3    Hemoglobin 11.1 (L) 14.0 - 18.0 gm/dl    Hematocrit 33.4 (L) 42.0 - 52.0 %    MCV 96.7 (H) 80.0 - 94.0 fL    MCH 32.0 (H) 27.0 - 31.0 pg    MCHC 33.1 33.0 - 37.0 gm/dl    RDW 18.4 (H) 11.5 - 14.5 %    Platelets 670 545 - 260 thou/mm3    MPV 8.7 7.4 - 10.4 mcm    RBC Morphology NORMAL     Seg Neutrophils 67.5 %    Lymphocytes 17.7 %    Monocytes Glucose 110 (H) 70 - 108 mg/dl   POCT glucose   Result Value Ref Range    POC Glucose 89 70 - 108 mg/dl   POCT glucose   Result Value Ref Range    POC Glucose 236 (H) 70 - 108 mg/dl   POCT glucose   Result Value Ref Range    POC Glucose 150 (H) 70 - 108 mg/dl   POCT Glucose   Result Value Ref Range    POC Glucose 152 (H) 70 - 108 mg/dl   POCT glucose   Result Value Ref Range    POC Glucose 150 (H) 70 - 108 mg/dl   POCT glucose   Result Value Ref Range    POC Glucose 122 (H) 70 - 108 mg/dl   POCT glucose   Result Value Ref Range    POC Glucose 167 (H) 70 - 108 mg/dl   POCT glucose   Result Value Ref Range    POC Glucose 190 (H) 70 - 108 mg/dl   POCT glucose   Result Value Ref Range    POC Glucose 121 (H) 70 - 108 mg/dl   EKG SOB   Result Value Ref Range    Ventricular Rate 77 BPM    Atrial Rate 77 BPM    P-R Interval 170 ms    QRS Duration 124 ms    Q-T Interval 384 ms    QTc Calculation (Bazett) 434 ms    P Axis 11 degrees    R Axis -13 degrees    T Axis -131 degrees   EKG 12 lead   Result Value Ref Range    Ventricular Rate 76 BPM    Atrial Rate 76 BPM    P-R Interval 166 ms    QRS Duration 128 ms    Q-T Interval 432 ms    QTc Calculation (Bazett) 486 ms    P Axis 61 degrees    R Axis -13 degrees    T Axis 99 degrees       Diet:  DIET CARDIAC;     Activity:  Activity as tolerated (Patient may move about with assist as indicated or with supervision.)    Follow-up:  in the next few days with BAUTISTA Paez MD    Disposition: long term care facility    Condition: Stable      Time Spent: 35 minutes    Electronically signed by Hugh Lewis MD on 10/16/2017 at 11:41 AM    Discharging Hospitalist

## 2017-10-16 NOTE — PLAN OF CARE
Problem: Falls - Risk of  Goal: Absence of falls  Outcome: Met This Shift  Assessment & interventions provided throughout shift. Bed locked & in low position, call light in reach, side-rails up x2, non-slip socks on when ambulating, reminded patient to use call light to call for assistance.        Problem: Risk for Impaired Skin Integrity  Goal: Tissue integrity - skin and mucous membranes  Structural intactness and normal physiological function of skin and  mucous membranes. Outcome: Met This Shift  Ongoing assessment & interventions provided throughout shift. Skin assessments provided. Encouraging/assisting patient to turn as needed.        Problem: Aspiration:  Goal: Absence of aspiration  Absence of aspiration   Outcome: Ongoing  Lung sounds, pulse ox, and breathing monitored throughout shift. Discussed correct technique and importance of deep breathing & coughing exercises with patient. Patient able to demonstrate cough & deep breathing exercises to nurse.        Problem: Pain:  Goal: Pain level will decrease  Pain level will decrease   Outcome: Met This Shift  Ongoing assessment & interventions provided throughout shift. Reminded patient to report any pain, pressure, or shortness of breath to the nurse. Pain medications provided per physician's orders.        Problem: Serum Glucose Level - Abnormal:  Goal: Ability to maintain appropriate glucose levels will improve to within specified parameters  Ability to maintain appropriate glucose levels will improve to within specified parameters   Outcome: Met This Shift  Giving insulin per sliding scale and lantus as ordered      Comments: Care plan reviewed with patient.   Patient verbalizes understanding of the care plan and contributed to goal setting.   
Problem: Impaired respiratory status  Goal: Clear lung sounds  Outcome: Ongoing  Duoneb to improve breath sounds.
Problem: Impaired respiratory status  Goal: Clear lung sounds  Outcome: Ongoing  Patient has diminished breath sounds at this time. Continue treatment as ordered.
Problem: Impaired respiratory status  Goal: Normal spontaneous ventilation  Outcome: Not Met This Shift  Pt placed on bipap for altered mental status and elevated CO2 levels. Repeat abgs showed improvement. Continue to wean oxygen and monitor for any needed changes. Goal: Absence of new skin breakdown  Outcome: Met This Shift  Changed to performa mask with bipap to help prevent pressure sores from mask.   Continue to switch mask every 4 hours as tolerated and use gecko pad with full face mask
decrease  Outcome: Ongoing  Patient denies any pain during this shift. Will continue to monitor and treat accordingly. Problem: Serum Glucose Level - Abnormal:  Goal: Ability to maintain appropriate glucose levels will improve to within specified parameters  Ability to maintain appropriate glucose levels will improve to within specified parameters  Outcome: Ongoing  Patient's nighttime glucose 110. Patient did not receive lantus or humalog. Will continue to monitor and treat accordingly. Comments: Care plan reviewed with patient. Patient verbalized understanding of the plan of care and contribute to goal setting.

## 2017-12-12 NOTE — PROGRESS NOTES
11 years on device   imped 513  Shock 42 SVC 50  R waves 12.6  0% paced   Possible short bursts at fib  optivol WNL

## 2018-01-01 ENCOUNTER — APPOINTMENT (OUTPATIENT)
Dept: GENERAL RADIOLOGY | Age: 68
DRG: 682 | End: 2018-01-01
Payer: MEDICARE

## 2018-01-01 ENCOUNTER — OFFICE VISIT (OUTPATIENT)
Dept: NEPHROLOGY | Age: 68
End: 2018-01-01
Payer: MEDICARE

## 2018-01-01 ENCOUNTER — HOSPITAL ENCOUNTER (INPATIENT)
Age: 68
LOS: 1 days | DRG: 189 | End: 2018-01-15
Attending: INTERNAL MEDICINE | Admitting: INTERNAL MEDICINE
Payer: MEDICARE

## 2018-01-01 ENCOUNTER — HOSPITAL ENCOUNTER (INPATIENT)
Age: 68
LOS: 2 days | Discharge: HOSPICE | DRG: 682 | End: 2018-01-15
Attending: EMERGENCY MEDICINE | Admitting: FAMILY MEDICINE
Payer: MEDICARE

## 2018-01-01 ENCOUNTER — APPOINTMENT (OUTPATIENT)
Dept: CT IMAGING | Age: 68
DRG: 682 | End: 2018-01-01
Payer: MEDICARE

## 2018-01-01 VITALS
RESPIRATION RATE: 21 BRPM | HEIGHT: 72 IN | TEMPERATURE: 98.6 F | HEART RATE: 87 BPM | OXYGEN SATURATION: 100 % | DIASTOLIC BLOOD PRESSURE: 79 MMHG | SYSTOLIC BLOOD PRESSURE: 164 MMHG | BODY MASS INDEX: 31.32 KG/M2 | WEIGHT: 231.26 LBS

## 2018-01-01 VITALS
HEART RATE: 102 BPM | DIASTOLIC BLOOD PRESSURE: 58 MMHG | RESPIRATION RATE: 24 BRPM | SYSTOLIC BLOOD PRESSURE: 164 MMHG | OXYGEN SATURATION: 54 %

## 2018-01-01 VITALS — HEIGHT: 72 IN | HEART RATE: 72 BPM | SYSTOLIC BLOOD PRESSURE: 130 MMHG | DIASTOLIC BLOOD PRESSURE: 70 MMHG

## 2018-01-01 DIAGNOSIS — N17.9 ACUTE RENAL FAILURE SUPERIMPOSED ON CHRONIC KIDNEY DISEASE, UNSPECIFIED CKD STAGE, UNSPECIFIED ACUTE RENAL FAILURE TYPE (HCC): Primary | ICD-10-CM

## 2018-01-01 DIAGNOSIS — E87.5 HYPERKALEMIA: ICD-10-CM

## 2018-01-01 DIAGNOSIS — N18.9 ACUTE RENAL FAILURE SUPERIMPOSED ON CHRONIC KIDNEY DISEASE, UNSPECIFIED CKD STAGE, UNSPECIFIED ACUTE RENAL FAILURE TYPE (HCC): Primary | ICD-10-CM

## 2018-01-01 DIAGNOSIS — N18.4 CKD (CHRONIC KIDNEY DISEASE), STAGE IV (HCC): Primary | ICD-10-CM

## 2018-01-01 LAB
ALBUMIN SERPL-MCNC: 3.5 G/DL (ref 3.5–5.1)
ALBUMIN SERPL-MCNC: 3.7 G/DL (ref 3.5–5.1)
ALLEN TEST: POSITIVE
ALP BLD-CCNC: 79 U/L (ref 38–126)
ALP BLD-CCNC: 87 U/L (ref 38–126)
ALT SERPL-CCNC: 13 U/L (ref 11–66)
ALT SERPL-CCNC: 17 U/L (ref 11–66)
AMMONIA: 20 UMOL/L (ref 11–60)
ANION GAP SERPL CALCULATED.3IONS-SCNC: 12 MEQ/L (ref 8–16)
ANION GAP SERPL CALCULATED.3IONS-SCNC: 15 MEQ/L (ref 8–16)
ANION GAP SERPL CALCULATED.3IONS-SCNC: 18 MEQ/L (ref 8–16)
ANION GAP SERPL CALCULATED.3IONS-SCNC: 28 MEQ/L (ref 8–16)
ANISOCYTOSIS: ABNORMAL
ANISOCYTOSIS: ABNORMAL
APTT: 28.7 SECONDS (ref 22–38)
AST SERPL-CCNC: 20 U/L (ref 5–40)
AST SERPL-CCNC: 22 U/L (ref 5–40)
AVERAGE GLUCOSE: 93 MG/DL (ref 70–126)
BACTERIA: ABNORMAL /HPF
BASE EXCESS (CALCULATED): -1.1 MMOL/L (ref -2.5–2.5)
BASE EXCESS (CALCULATED): -1.8 MMOL/L (ref -2.5–2.5)
BASE EXCESS (CALCULATED): -2.6 MMOL/L (ref -2.5–2.5)
BASE EXCESS (CALCULATED): -3.8 MMOL/L (ref -2.5–2.5)
BASE EXCESS (CALCULATED): 0.4 MMOL/L (ref -2.5–2.5)
BASOPHILS # BLD: 0.6 %
BASOPHILS # BLD: 1.2 %
BASOPHILS ABSOLUTE: 0.1 THOU/MM3 (ref 0–0.1)
BASOPHILS ABSOLUTE: 0.1 THOU/MM3 (ref 0–0.1)
BILIRUB SERPL-MCNC: 0.4 MG/DL (ref 0.3–1.2)
BILIRUB SERPL-MCNC: 0.4 MG/DL (ref 0.3–1.2)
BILIRUBIN DIRECT: < 0.2 MG/DL (ref 0–0.3)
BILIRUBIN URINE: ABNORMAL
BLOOD, URINE: ABNORMAL
BUN BLDV-MCNC: 42 MG/DL (ref 7–22)
BUN BLDV-MCNC: 45 MG/DL (ref 7–22)
BUN BLDV-MCNC: 49 MG/DL (ref 7–22)
BUN BLDV-MCNC: 54 MG/DL (ref 7–22)
CALCIUM SERPL-MCNC: 8.5 MG/DL
CALCIUM SERPL-MCNC: 8.6 MG/DL (ref 8.5–10.5)
CALCIUM SERPL-MCNC: 9 MG/DL (ref 8.5–10.5)
CALCIUM SERPL-MCNC: 9.1 MG/DL (ref 8.5–10.5)
CALCIUM SERPL-MCNC: 9.3 MG/DL (ref 8.5–10.5)
CASTS UA: ABNORMAL /LPF
CHARACTER, URINE: ABNORMAL
CHLORIDE BLD-SCNC: 102 MEQ/L (ref 98–111)
CHLORIDE BLD-SCNC: 102 MEQ/L (ref 98–111)
CHLORIDE BLD-SCNC: 105 MEQ/L (ref 98–111)
CHLORIDE BLD-SCNC: 118 MEQ/L (ref 98–111)
CHLORIDE, URINE: < 20 MEQ/L
CO2: 11 MEQ/L (ref 23–33)
CO2: 20 MEQ/L (ref 23–33)
CO2: 23 MEQ/L (ref 23–33)
CO2: 26 MEQ/L (ref 23–33)
COLLECTED BY:: ABNORMAL
COLOR: ABNORMAL
CREAT SERPL-MCNC: 3.9 MG/DL (ref 0.4–1.2)
CREAT SERPL-MCNC: 4.1 MG/DL (ref 0.4–1.2)
CREAT SERPL-MCNC: 4.2 MG/DL (ref 0.4–1.2)
CREAT SERPL-MCNC: 4.6 MG/DL (ref 0.4–1.2)
CREATININE URINE: 202.2 MG/DL
CRYSTALS, UA: ABNORMAL
D-DIMER QUANTITATIVE: 2288 NG/ML FEU (ref 0–500)
DEVICE: ABNORMAL
EKG ATRIAL RATE: 75 BPM
EKG P AXIS: 9 DEGREES
EKG P-R INTERVAL: 178 MS
EKG Q-T INTERVAL: 438 MS
EKG QRS DURATION: 128 MS
EKG QTC CALCULATION (BAZETT): 489 MS
EKG R AXIS: -10 DEGREES
EKG T AXIS: -115 DEGREES
EKG VENTRICULAR RATE: 75 BPM
EOSINOPHIL # BLD: 0 %
EOSINOPHIL # BLD: 0.2 %
EOSINOPHILS ABSOLUTE: 0 THOU/MM3 (ref 0–0.4)
EOSINOPHILS ABSOLUTE: 0 THOU/MM3 (ref 0–0.4)
EPITHELIAL CELLS, UA: ABNORMAL /HPF
FERRITIN: 26 NG/ML (ref 18–300)
FLU A ANTIGEN: NEGATIVE
FLU B ANTIGEN: NEGATIVE
GFR SERPL CREATININE-BSD FRML MDRD: 15 ML/MIN/1.73M2
GFR SERPL CREATININE-BSD FRML MDRD: 17 ML/MIN/1.73M2
GFR SERPL CREATININE-BSD FRML MDRD: 18 ML/MIN/1.73M2
GFR SERPL CREATININE-BSD FRML MDRD: 19 ML/MIN/1.73M2
GLUCOSE BLD-MCNC: 124 MG/DL (ref 70–108)
GLUCOSE BLD-MCNC: 134 MG/DL (ref 70–108)
GLUCOSE BLD-MCNC: 139 MG/DL (ref 70–108)
GLUCOSE BLD-MCNC: 147 MG/DL (ref 70–108)
GLUCOSE BLD-MCNC: 156 MG/DL (ref 70–108)
GLUCOSE BLD-MCNC: 166 MG/DL (ref 70–108)
GLUCOSE BLD-MCNC: 171 MG/DL (ref 70–108)
GLUCOSE BLD-MCNC: 176 MG/DL (ref 70–108)
GLUCOSE BLD-MCNC: 184 MG/DL (ref 70–108)
GLUCOSE URINE: NEGATIVE MG/DL
HBA1C MFR BLD: 5.1 % (ref 4.4–6.4)
HCO3: 23 MMOL/L (ref 23–28)
HCO3: 27 MMOL/L (ref 23–28)
HCO3: 27 MMOL/L (ref 23–28)
HCO3: 29 MMOL/L (ref 23–28)
HCO3: 30 MMOL/L (ref 23–28)
HCT VFR BLD CALC: 32.3 % (ref 41–53)
HCT VFR BLD CALC: 35.8 % (ref 42–52)
HCT VFR BLD CALC: 37.5 % (ref 42–52)
HCT VFR BLD CALC: 38.8 % (ref 42–52)
HEMOGLOBIN: 10.4 G/DL (ref 13.5–17.5)
HEMOGLOBIN: 11.2 GM/DL (ref 14–18)
HEMOGLOBIN: 11.9 GM/DL (ref 14–18)
HEMOGLOBIN: 12.4 GM/DL (ref 14–18)
ICTOTEST: NEGATIVE
IFIO2: 28
IFIO2: 30
IFIO2: 40
IFIO2: 40
IFIO2: 6
KETONES, URINE: NEGATIVE
LACTIC ACID: 1.2 MMOL/L (ref 0.5–2.2)
LACTIC ACID: 1.2 MMOL/L (ref 0.5–2.2)
LACTIC ACID: 1.5 MMOL/L (ref 0.5–2.2)
LEUKOCYTE ESTERASE, URINE: ABNORMAL
LIPASE: 7.7 U/L (ref 5.6–51.3)
LYMPHOCYTES # BLD: 12.2 %
LYMPHOCYTES # BLD: 21.3 %
LYMPHOCYTES ABSOLUTE: 1.4 THOU/MM3 (ref 1–4.8)
LYMPHOCYTES ABSOLUTE: 1.7 THOU/MM3 (ref 1–4.8)
MACROCYTES: ABNORMAL
MCH RBC QN AUTO: 31.2 PG (ref 27–31)
MCH RBC QN AUTO: 31.7 PG (ref 27–31)
MCH RBC QN AUTO: 32.2 PG (ref 27–31)
MCHC RBC AUTO-ENTMCNC: 31.3 GM/DL (ref 33–37)
MCHC RBC AUTO-ENTMCNC: 31.6 GM/DL (ref 33–37)
MCHC RBC AUTO-ENTMCNC: 31.9 GM/DL (ref 33–37)
MCV RBC AUTO: 100.9 FL (ref 80–94)
MCV RBC AUTO: 101.2 FL (ref 80–94)
MCV RBC AUTO: 98.8 FL (ref 80–94)
MODE: ABNORMAL
MODE: ABNORMAL
MONOCYTES # BLD: 12.7 %
MONOCYTES # BLD: 13.6 %
MONOCYTES ABSOLUTE: 1.1 THOU/MM3 (ref 0.4–1.3)
MONOCYTES ABSOLUTE: 1.4 THOU/MM3 (ref 0.4–1.3)
MRSA SCREEN: ABNORMAL
MRSA SCREEN: NORMAL
MYOGLOBIN URINE: NEGATIVE
NITRITE, URINE: NEGATIVE
NUCLEATED RED BLOOD CELLS: 0 /100 WBC
NUCLEATED RED BLOOD CELLS: 1 /100 WBC
O2 SATURATION: 79 %
O2 SATURATION: 85 %
O2 SATURATION: 87 %
O2 SATURATION: 95 %
O2 SATURATION: 96 %
ORGANISM: ABNORMAL
OSMOLALITY CALCULATION: 291.3 MOSMOL/KG (ref 275–300)
OSMOLALITY URINE: 430 MOSMOL/KG (ref 250–750)
OSMOLALITY: 323 MOSMOL/KG (ref 275–295)
PCO2: 49 MMHG (ref 35–45)
PCO2: 65 MMHG (ref 35–45)
PCO2: 67 MMHG (ref 35–45)
PCO2: 69 MMHG (ref 35–45)
PCO2: 86 MMHG (ref 35–45)
PDW BLD-RTO: 17.8 % (ref 11.5–14.5)
PDW BLD-RTO: 17.9 % (ref 11.5–14.5)
PDW BLD-RTO: 18.7 % (ref 11.5–14.5)
PH BLOOD GAS: 7.15 (ref 7.35–7.45)
PH BLOOD GAS: 7.2 (ref 7.35–7.45)
PH BLOOD GAS: 7.23 (ref 7.35–7.45)
PH BLOOD GAS: 7.25 (ref 7.35–7.45)
PH BLOOD GAS: 7.28 (ref 7.35–7.45)
PH UA: 5.5
PHOSPHORUS: 4.7 MG/DL
PLATELET # BLD: 224 THOU/MM3 (ref 130–400)
PLATELET # BLD: 226 THOU/MM3 (ref 130–400)
PLATELET # BLD: 235 THOU/MM3 (ref 130–400)
PMV BLD AUTO: 7.7 MCM (ref 7.4–10.4)
PMV BLD AUTO: 8 MCM (ref 7.4–10.4)
PMV BLD AUTO: 8.1 MCM (ref 7.4–10.4)
PO2: 59 MMHG (ref 71–104)
PO2: 61 MMHG (ref 71–104)
PO2: 63 MMHG (ref 71–104)
PO2: 94 MMHG (ref 71–104)
PO2: 97 MMHG (ref 71–104)
POTASSIUM SERPL-SCNC: 5.1 MEQ/L (ref 3.5–5.2)
POTASSIUM SERPL-SCNC: 5.4 MEQ/L (ref 3.5–5.2)
POTASSIUM SERPL-SCNC: 5.9 MEQ/L (ref 3.5–5.2)
POTASSIUM SERPL-SCNC: 5.9 MEQ/L (ref 3.5–5.2)
POTASSIUM SERPL-SCNC: 6 MEQ/L (ref 3.5–5.2)
POTASSIUM, URINE: 61.2 MEQ/L
PRO-BNP: ABNORMAL PG/ML (ref 0–900)
PROCALCITONIN: 0.48 NG/ML (ref 0.01–0.09)
PROCALCITONIN: 0.49 NG/ML (ref 0.01–0.09)
PROSTATE SPECIFIC ANTIGEN: 0.04 NG/ML (ref 0–1)
PROTEIN UA: 300
PTH INTACT: 99
RBC # BLD: 3.54 MILL/MM3 (ref 4.7–6.1)
RBC # BLD: 3.8 MILL/MM3 (ref 4.7–6.1)
RBC # BLD: 3.85 MILL/MM3 (ref 4.7–6.1)
RBC URINE: > 100 /HPF
SEG NEUTROPHILS: 63.9 %
SEG NEUTROPHILS: 74.3 %
SEGMENTED NEUTROPHILS ABSOLUTE COUNT: 5.1 THOU/MM3 (ref 1.8–7.7)
SEGMENTED NEUTROPHILS ABSOLUTE COUNT: 8.3 THOU/MM3 (ref 1.8–7.7)
SET PEEP: 5 MMHG
SET PEEP: 8 MMHG
SET PRESS SUPP: 10 CMH2O
SET PRESS SUPP: 14 CMH2O
SET PRESS SUPP: 22 CMH2O
SET RESPIRATORY RATE: 18 BPM
SODIUM BLD-SCNC: 140 MEQ/L (ref 135–145)
SODIUM BLD-SCNC: 140 MEQ/L (ref 135–145)
SODIUM BLD-SCNC: 143 MEQ/L (ref 135–145)
SODIUM BLD-SCNC: 157 MEQ/L (ref 135–145)
SODIUM URINE: 21 MEQ/L
SOURCE, BLOOD GAS: ABNORMAL
SPECIFIC GRAVITY, URINE: 1.02 (ref 1–1.03)
TOTAL CK: 104 U/L (ref 55–170)
TOTAL PROTEIN: 7.5 G/DL (ref 6.1–8)
TOTAL PROTEIN: 8 G/DL (ref 6.1–8)
TROPONIN T: 0.11 NG/ML
TROPONIN T: 0.11 NG/ML
TROPONIN T: 0.12 NG/ML
TROPONIN T: 0.12 NG/ML
TROPONIN T: 0.17 NG/ML
TROPONIN T: 0.19 NG/ML
TROPONIN T: 0.22 NG/ML
URIC ACID: 9.2 MG/DL (ref 3.7–7)
URIC ACID: 9.2 MG/DL (ref 3.7–7)
UROBILINOGEN, URINE: 1 EU/DL
VITAMIN D 25-HYDROXY: 29
VITAMIN D2, 25 HYDROXY: NORMAL
VITAMIN D3,25 HYDROXY: NORMAL
VRE CULTURE: NORMAL
WBC # BLD: 11.2 THOU/MM3 (ref 4.8–10.8)
WBC # BLD: 12.5 THOU/MM3 (ref 4.8–10.8)
WBC # BLD: 8 THOU/MM3 (ref 4.8–10.8)
WBC UA: > 100 /HPF

## 2018-01-01 PROCEDURE — 3017F COLORECTAL CA SCREEN DOC REV: CPT | Performed by: NURSE PRACTITIONER

## 2018-01-01 PROCEDURE — 94660 CPAP INITIATION&MGMT: CPT

## 2018-01-01 PROCEDURE — G8428 CUR MEDS NOT DOCUMENT: HCPCS | Performed by: NURSE PRACTITIONER

## 2018-01-01 PROCEDURE — 99239 HOSP IP/OBS DSCHRG MGMT >30: CPT | Performed by: INTERNAL MEDICINE

## 2018-01-01 PROCEDURE — 83036 HEMOGLOBIN GLYCOSYLATED A1C: CPT

## 2018-01-01 PROCEDURE — 6360000002 HC RX W HCPCS: Performed by: INTERNAL MEDICINE

## 2018-01-01 PROCEDURE — 87804 INFLUENZA ASSAY W/OPTIC: CPT

## 2018-01-01 PROCEDURE — 82803 BLOOD GASES ANY COMBINATION: CPT

## 2018-01-01 PROCEDURE — 82248 BILIRUBIN DIRECT: CPT

## 2018-01-01 PROCEDURE — 87086 URINE CULTURE/COLONY COUNT: CPT

## 2018-01-01 PROCEDURE — 2500000003 HC RX 250 WO HCPCS: Performed by: INTERNAL MEDICINE

## 2018-01-01 PROCEDURE — 99213 OFFICE O/P EST LOW 20 MIN: CPT | Performed by: NURSE PRACTITIONER

## 2018-01-01 PROCEDURE — 93005 ELECTROCARDIOGRAM TRACING: CPT

## 2018-01-01 PROCEDURE — 6370000000 HC RX 637 (ALT 250 FOR IP): Performed by: FAMILY MEDICINE

## 2018-01-01 PROCEDURE — 99223 1ST HOSP IP/OBS HIGH 75: CPT | Performed by: FAMILY MEDICINE

## 2018-01-01 PROCEDURE — G8598 ASA/ANTIPLAT THER USED: HCPCS | Performed by: NURSE PRACTITIONER

## 2018-01-01 PROCEDURE — 36415 COLL VENOUS BLD VENIPUNCTURE: CPT

## 2018-01-01 PROCEDURE — 85027 COMPLETE CBC AUTOMATED: CPT

## 2018-01-01 PROCEDURE — 51702 INSERT TEMP BLADDER CATH: CPT

## 2018-01-01 PROCEDURE — 99232 SBSQ HOSP IP/OBS MODERATE 35: CPT | Performed by: INTERNAL MEDICINE

## 2018-01-01 PROCEDURE — 80053 COMPREHEN METABOLIC PANEL: CPT

## 2018-01-01 PROCEDURE — 1200000003 HC TELEMETRY R&B

## 2018-01-01 PROCEDURE — 81001 URINALYSIS AUTO W/SCOPE: CPT

## 2018-01-01 PROCEDURE — 85025 COMPLETE CBC W/AUTO DIFF WBC: CPT

## 2018-01-01 PROCEDURE — 84133 ASSAY OF URINE POTASSIUM: CPT

## 2018-01-01 PROCEDURE — 70450 CT HEAD/BRAIN W/O DYE: CPT

## 2018-01-01 PROCEDURE — 6370000000 HC RX 637 (ALT 250 FOR IP): Performed by: EMERGENCY MEDICINE

## 2018-01-01 PROCEDURE — 2700000000 HC OXYGEN THERAPY PER DAY

## 2018-01-01 PROCEDURE — 2580000003 HC RX 258: Performed by: FAMILY MEDICINE

## 2018-01-01 PROCEDURE — 83605 ASSAY OF LACTIC ACID: CPT

## 2018-01-01 PROCEDURE — 36600 WITHDRAWAL OF ARTERIAL BLOOD: CPT

## 2018-01-01 PROCEDURE — 80048 BASIC METABOLIC PNL TOTAL CA: CPT

## 2018-01-01 PROCEDURE — 99223 1ST HOSP IP/OBS HIGH 75: CPT | Performed by: INTERNAL MEDICINE

## 2018-01-01 PROCEDURE — 71045 X-RAY EXAM CHEST 1 VIEW: CPT

## 2018-01-01 PROCEDURE — 82948 REAGENT STRIP/BLOOD GLUCOSE: CPT

## 2018-01-01 PROCEDURE — 6360000002 HC RX W HCPCS: Performed by: EMERGENCY MEDICINE

## 2018-01-01 PROCEDURE — 84484 ASSAY OF TROPONIN QUANT: CPT

## 2018-01-01 PROCEDURE — 84145 PROCALCITONIN (PCT): CPT

## 2018-01-01 PROCEDURE — 83880 ASSAY OF NATRIURETIC PEPTIDE: CPT

## 2018-01-01 PROCEDURE — 82436 ASSAY OF URINE CHLORIDE: CPT

## 2018-01-01 PROCEDURE — 84132 ASSAY OF SERUM POTASSIUM: CPT

## 2018-01-01 PROCEDURE — 85379 FIBRIN DEGRADATION QUANT: CPT

## 2018-01-01 PROCEDURE — G8484 FLU IMMUNIZE NO ADMIN: HCPCS | Performed by: NURSE PRACTITIONER

## 2018-01-01 PROCEDURE — 84550 ASSAY OF BLOOD/URIC ACID: CPT

## 2018-01-01 PROCEDURE — 2580000003 HC RX 258: Performed by: INTERNAL MEDICINE

## 2018-01-01 PROCEDURE — G8417 CALC BMI ABV UP PARAM F/U: HCPCS | Performed by: NURSE PRACTITIONER

## 2018-01-01 PROCEDURE — 83930 ASSAY OF BLOOD OSMOLALITY: CPT

## 2018-01-01 PROCEDURE — 1123F ACP DISCUSS/DSCN MKR DOCD: CPT | Performed by: NURSE PRACTITIONER

## 2018-01-01 PROCEDURE — 2580000003 HC RX 258: Performed by: EMERGENCY MEDICINE

## 2018-01-01 PROCEDURE — 99221 1ST HOSP IP/OBS SF/LOW 40: CPT | Performed by: INTERNAL MEDICINE

## 2018-01-01 PROCEDURE — 84153 ASSAY OF PSA TOTAL: CPT

## 2018-01-01 PROCEDURE — 87040 BLOOD CULTURE FOR BACTERIA: CPT

## 2018-01-01 PROCEDURE — 4040F PNEUMOC VAC/ADMIN/RCVD: CPT | Performed by: NURSE PRACTITIONER

## 2018-01-01 PROCEDURE — 1036F TOBACCO NON-USER: CPT | Performed by: NURSE PRACTITIONER

## 2018-01-01 PROCEDURE — 82550 ASSAY OF CK (CPK): CPT

## 2018-01-01 PROCEDURE — 87449 NOS EACH ORGANISM AG IA: CPT

## 2018-01-01 PROCEDURE — 87081 CULTURE SCREEN ONLY: CPT

## 2018-01-01 PROCEDURE — 83874 ASSAY OF MYOGLOBIN: CPT

## 2018-01-01 PROCEDURE — 83690 ASSAY OF LIPASE: CPT

## 2018-01-01 PROCEDURE — 87899 AGENT NOS ASSAY W/OPTIC: CPT

## 2018-01-01 PROCEDURE — 85730 THROMBOPLASTIN TIME PARTIAL: CPT

## 2018-01-01 PROCEDURE — 99285 EMERGENCY DEPT VISIT HI MDM: CPT

## 2018-01-01 PROCEDURE — 6360000002 HC RX W HCPCS: Performed by: FAMILY MEDICINE

## 2018-01-01 PROCEDURE — 84300 ASSAY OF URINE SODIUM: CPT

## 2018-01-01 PROCEDURE — 87147 CULTURE TYPE IMMUNOLOGIC: CPT

## 2018-01-01 PROCEDURE — 96365 THER/PROPH/DIAG IV INF INIT: CPT

## 2018-01-01 PROCEDURE — 82140 ASSAY OF AMMONIA: CPT

## 2018-01-01 PROCEDURE — 82570 ASSAY OF URINE CREATININE: CPT

## 2018-01-01 PROCEDURE — 83935 ASSAY OF URINE OSMOLALITY: CPT

## 2018-01-01 RX ORDER — CLOPIDOGREL BISULFATE 75 MG/1
75 TABLET ORAL DAILY
Status: DISCONTINUED | OUTPATIENT
Start: 2018-01-01 | End: 2018-01-01 | Stop reason: HOSPADM

## 2018-01-01 RX ORDER — GLYCOPYRROLATE 0.2 MG/ML
0.2 INJECTION INTRAMUSCULAR; INTRAVENOUS
Status: CANCELLED | OUTPATIENT
Start: 2018-01-01

## 2018-01-01 RX ORDER — HEPARIN SODIUM 5000 [USP'U]/ML
5000 INJECTION, SOLUTION INTRAVENOUS; SUBCUTANEOUS 2 TIMES DAILY
Status: DISCONTINUED | OUTPATIENT
Start: 2018-01-01 | End: 2018-01-01 | Stop reason: SDUPTHER

## 2018-01-01 RX ORDER — GABAPENTIN 300 MG/1
300 CAPSULE ORAL 3 TIMES DAILY
Status: DISCONTINUED | OUTPATIENT
Start: 2018-01-01 | End: 2018-01-01 | Stop reason: HOSPADM

## 2018-01-01 RX ORDER — MORPHINE SULFATE/0.9% NACL/PF 1 MG/ML
SYRINGE (ML) INJECTION CONTINUOUS
Status: DISCONTINUED | OUTPATIENT
Start: 2018-01-01 | End: 2018-01-01 | Stop reason: HOSPADM

## 2018-01-01 RX ORDER — HEPARIN SODIUM 1000 [USP'U]/ML
40 INJECTION, SOLUTION INTRAVENOUS; SUBCUTANEOUS PRN
Status: DISCONTINUED | OUTPATIENT
Start: 2018-01-01 | End: 2018-01-01 | Stop reason: ALTCHOICE

## 2018-01-01 RX ORDER — ATORVASTATIN CALCIUM 40 MG/1
40 TABLET, FILM COATED ORAL NIGHTLY
Status: DISCONTINUED | OUTPATIENT
Start: 2018-01-01 | End: 2018-01-01 | Stop reason: HOSPADM

## 2018-01-01 RX ORDER — SODIUM CHLORIDE 0.9 % (FLUSH) 0.9 %
10 SYRINGE (ML) INJECTION EVERY 12 HOURS SCHEDULED
Status: CANCELLED | OUTPATIENT
Start: 2018-01-01

## 2018-01-01 RX ORDER — OXYBUTYNIN CHLORIDE 5 MG/1
5 TABLET ORAL 3 TIMES DAILY
Status: DISCONTINUED | OUTPATIENT
Start: 2018-01-01 | End: 2018-01-01 | Stop reason: HOSPADM

## 2018-01-01 RX ORDER — PROMETHAZINE HYDROCHLORIDE 25 MG/1
25 TABLET ORAL EVERY 6 HOURS PRN
Status: ON HOLD | COMMUNITY
End: 2018-01-01

## 2018-01-01 RX ORDER — SENNA AND DOCUSATE SODIUM 50; 8.6 MG/1; MG/1
1 TABLET, FILM COATED ORAL NIGHTLY PRN
Status: DISCONTINUED | OUTPATIENT
Start: 2018-01-01 | End: 2018-01-01 | Stop reason: HOSPADM

## 2018-01-01 RX ORDER — SODIUM POLYSTYRENE SULFONATE 15 G/60ML
30 SUSPENSION ORAL; RECTAL ONCE
Status: COMPLETED | OUTPATIENT
Start: 2018-01-01 | End: 2018-01-01

## 2018-01-01 RX ORDER — DEXTROSE MONOHYDRATE 25 G/50ML
12.5 INJECTION, SOLUTION INTRAVENOUS PRN
Status: DISCONTINUED | OUTPATIENT
Start: 2018-01-01 | End: 2018-01-01 | Stop reason: HOSPADM

## 2018-01-01 RX ORDER — MORPHINE SULFATE/0.9% NACL/PF 1 MG/ML
SYRINGE (ML) INJECTION CONTINUOUS
Status: CANCELLED | OUTPATIENT
Start: 2018-01-01

## 2018-01-01 RX ORDER — ONDANSETRON 2 MG/ML
4 INJECTION INTRAMUSCULAR; INTRAVENOUS EVERY 6 HOURS PRN
Status: DISCONTINUED | OUTPATIENT
Start: 2018-01-01 | End: 2018-01-01 | Stop reason: HOSPADM

## 2018-01-01 RX ORDER — MORPHINE SULFATE 2 MG/ML
1 INJECTION, SOLUTION INTRAMUSCULAR; INTRAVENOUS
Status: DISCONTINUED | OUTPATIENT
Start: 2018-01-01 | End: 2018-01-01 | Stop reason: HOSPADM

## 2018-01-01 RX ORDER — IPRATROPIUM BROMIDE AND ALBUTEROL SULFATE 2.5; .5 MG/3ML; MG/3ML
1 SOLUTION RESPIRATORY (INHALATION) EVERY 4 HOURS PRN
Status: DISCONTINUED | OUTPATIENT
Start: 2018-01-01 | End: 2018-01-16 | Stop reason: HOSPADM

## 2018-01-01 RX ORDER — HEPARIN SODIUM 1000 [USP'U]/ML
80 INJECTION, SOLUTION INTRAVENOUS; SUBCUTANEOUS ONCE
Status: COMPLETED | OUTPATIENT
Start: 2018-01-01 | End: 2018-01-01

## 2018-01-01 RX ORDER — ONDANSETRON 2 MG/ML
4 INJECTION INTRAMUSCULAR; INTRAVENOUS EVERY 6 HOURS PRN
Status: DISCONTINUED | OUTPATIENT
Start: 2018-01-01 | End: 2018-01-16 | Stop reason: HOSPADM

## 2018-01-01 RX ORDER — IPRATROPIUM BROMIDE AND ALBUTEROL SULFATE 2.5; .5 MG/3ML; MG/3ML
1 SOLUTION RESPIRATORY (INHALATION) EVERY 4 HOURS PRN
Status: CANCELLED | OUTPATIENT
Start: 2018-01-01

## 2018-01-01 RX ORDER — ACETAMINOPHEN 650 MG/1
650 SUPPOSITORY RECTAL EVERY 4 HOURS PRN
Status: CANCELLED | OUTPATIENT
Start: 2018-01-01

## 2018-01-01 RX ORDER — MORPHINE SULFATE/0.9% NACL/PF 1 MG/ML
SYRINGE (ML) INJECTION CONTINUOUS
Status: DISCONTINUED | OUTPATIENT
Start: 2018-01-01 | End: 2018-01-16 | Stop reason: HOSPADM

## 2018-01-01 RX ORDER — CLONIDINE HYDROCHLORIDE 0.2 MG/1
0.2 TABLET ORAL EVERY 8 HOURS
Status: DISCONTINUED | OUTPATIENT
Start: 2018-01-01 | End: 2018-01-01 | Stop reason: HOSPADM

## 2018-01-01 RX ORDER — MORPHINE SULFATE 2 MG/ML
1 INJECTION, SOLUTION INTRAMUSCULAR; INTRAVENOUS
Status: DISCONTINUED | OUTPATIENT
Start: 2018-01-01 | End: 2018-01-16 | Stop reason: HOSPADM

## 2018-01-01 RX ORDER — MORPHINE SULFATE 2 MG/ML
1 INJECTION, SOLUTION INTRAMUSCULAR; INTRAVENOUS
Status: CANCELLED | OUTPATIENT
Start: 2018-01-01

## 2018-01-01 RX ORDER — 0.9 % SODIUM CHLORIDE 0.9 %
1000 INTRAVENOUS SOLUTION INTRAVENOUS ONCE
Status: COMPLETED | OUTPATIENT
Start: 2018-01-01 | End: 2018-01-01

## 2018-01-01 RX ORDER — HEPARIN SODIUM 1000 [USP'U]/ML
80 INJECTION, SOLUTION INTRAVENOUS; SUBCUTANEOUS PRN
Status: DISCONTINUED | OUTPATIENT
Start: 2018-01-01 | End: 2018-01-01 | Stop reason: ALTCHOICE

## 2018-01-01 RX ORDER — INSULIN GLARGINE 100 [IU]/ML
26 INJECTION, SOLUTION SUBCUTANEOUS NIGHTLY
Status: DISCONTINUED | OUTPATIENT
Start: 2018-01-01 | End: 2018-01-01 | Stop reason: HOSPADM

## 2018-01-01 RX ORDER — NITROGLYCERIN 0.4 MG/1
0.4 TABLET SUBLINGUAL EVERY 5 MIN PRN
Status: DISCONTINUED | OUTPATIENT
Start: 2018-01-01 | End: 2018-01-01 | Stop reason: HOSPADM

## 2018-01-01 RX ORDER — ALBUTEROL SULFATE 90 UG/1
2 AEROSOL, METERED RESPIRATORY (INHALATION) EVERY 6 HOURS PRN
Status: DISCONTINUED | OUTPATIENT
Start: 2018-01-01 | End: 2018-01-01 | Stop reason: HOSPADM

## 2018-01-01 RX ORDER — DOCUSATE SODIUM 100 MG/1
100 CAPSULE, LIQUID FILLED ORAL DAILY
Status: DISCONTINUED | OUTPATIENT
Start: 2018-01-01 | End: 2018-01-01 | Stop reason: HOSPADM

## 2018-01-01 RX ORDER — LEVOFLOXACIN 5 MG/ML
250 INJECTION, SOLUTION INTRAVENOUS EVERY 24 HOURS
Status: DISCONTINUED | OUTPATIENT
Start: 2018-01-01 | End: 2018-01-01 | Stop reason: HOSPADM

## 2018-01-01 RX ORDER — FUROSEMIDE 20 MG/1
20 TABLET ORAL DAILY
Status: DISCONTINUED | OUTPATIENT
Start: 2018-01-01 | End: 2018-01-01 | Stop reason: HOSPADM

## 2018-01-01 RX ORDER — ISOSORBIDE MONONITRATE 60 MG/1
240 TABLET, EXTENDED RELEASE ORAL DAILY
Status: DISCONTINUED | OUTPATIENT
Start: 2018-01-01 | End: 2018-01-01 | Stop reason: HOSPADM

## 2018-01-01 RX ORDER — SODIUM CHLORIDE 0.9 % (FLUSH) 0.9 %
10 SYRINGE (ML) INJECTION PRN
Status: DISCONTINUED | OUTPATIENT
Start: 2018-01-01 | End: 2018-01-16 | Stop reason: HOSPADM

## 2018-01-01 RX ORDER — SODIUM CHLORIDE 0.9 % (FLUSH) 0.9 %
10 SYRINGE (ML) INJECTION PRN
Status: DISCONTINUED | OUTPATIENT
Start: 2018-01-01 | End: 2018-01-01 | Stop reason: HOSPADM

## 2018-01-01 RX ORDER — ONDANSETRON 2 MG/ML
4 INJECTION INTRAMUSCULAR; INTRAVENOUS EVERY 6 HOURS PRN
Status: CANCELLED | OUTPATIENT
Start: 2018-01-01

## 2018-01-01 RX ORDER — DEXTROSE AND SODIUM CHLORIDE 5; .45 G/100ML; G/100ML
INJECTION, SOLUTION INTRAVENOUS CONTINUOUS
Status: DISCONTINUED | OUTPATIENT
Start: 2018-01-01 | End: 2018-01-01

## 2018-01-01 RX ORDER — ASPIRIN 325 MG
325 TABLET ORAL DAILY
Status: DISCONTINUED | OUTPATIENT
Start: 2018-01-01 | End: 2018-01-01 | Stop reason: HOSPADM

## 2018-01-01 RX ORDER — ROSUVASTATIN CALCIUM 10 MG/1
10 TABLET, COATED ORAL NIGHTLY
Status: DISCONTINUED | OUTPATIENT
Start: 2018-01-01 | End: 2018-01-01 | Stop reason: CLARIF

## 2018-01-01 RX ORDER — DEXTROSE AND SODIUM CHLORIDE 5; .9 G/100ML; G/100ML
100 INJECTION, SOLUTION INTRAVENOUS PRN
Status: DISCONTINUED | OUTPATIENT
Start: 2018-01-01 | End: 2018-01-01 | Stop reason: HOSPADM

## 2018-01-01 RX ORDER — HYDRALAZINE HYDROCHLORIDE 50 MG/1
100 TABLET, FILM COATED ORAL 3 TIMES DAILY
Status: DISCONTINUED | OUTPATIENT
Start: 2018-01-01 | End: 2018-01-01 | Stop reason: HOSPADM

## 2018-01-01 RX ORDER — LEVOFLOXACIN 5 MG/ML
500 INJECTION, SOLUTION INTRAVENOUS ONCE
Status: COMPLETED | OUTPATIENT
Start: 2018-01-01 | End: 2018-01-01

## 2018-01-01 RX ORDER — HALOPERIDOL 5 MG/ML
2 INJECTION INTRAMUSCULAR EVERY 6 HOURS PRN
Status: CANCELLED | OUTPATIENT
Start: 2018-01-01

## 2018-01-01 RX ORDER — SODIUM CHLORIDE 0.9 % (FLUSH) 0.9 %
10 SYRINGE (ML) INJECTION EVERY 12 HOURS SCHEDULED
Status: DISCONTINUED | OUTPATIENT
Start: 2018-01-01 | End: 2018-01-16 | Stop reason: HOSPADM

## 2018-01-01 RX ORDER — HEPARIN SODIUM 10000 [USP'U]/100ML
18 INJECTION, SOLUTION INTRAVENOUS CONTINUOUS
Status: DISCONTINUED | OUTPATIENT
Start: 2018-01-01 | End: 2018-01-01

## 2018-01-01 RX ORDER — INSULIN GLARGINE 100 [IU]/ML
30 INJECTION, SOLUTION SUBCUTANEOUS EVERY MORNING
Status: DISCONTINUED | OUTPATIENT
Start: 2018-01-01 | End: 2018-01-01 | Stop reason: HOSPADM

## 2018-01-01 RX ORDER — HALOPERIDOL 5 MG/ML
2 INJECTION INTRAMUSCULAR EVERY 6 HOURS PRN
Status: DISCONTINUED | OUTPATIENT
Start: 2018-01-01 | End: 2018-01-01 | Stop reason: HOSPADM

## 2018-01-01 RX ORDER — CALCITRIOL 0.25 UG/1
0.25 CAPSULE, LIQUID FILLED ORAL DAILY
Status: DISCONTINUED | OUTPATIENT
Start: 2018-01-01 | End: 2018-01-01 | Stop reason: HOSPADM

## 2018-01-01 RX ORDER — METOPROLOL SUCCINATE 100 MG/1
200 TABLET, EXTENDED RELEASE ORAL DAILY
Status: DISCONTINUED | OUTPATIENT
Start: 2018-01-01 | End: 2018-01-01 | Stop reason: HOSPADM

## 2018-01-01 RX ORDER — ACETAMINOPHEN 650 MG/1
650 SUPPOSITORY RECTAL EVERY 4 HOURS PRN
Status: DISCONTINUED | OUTPATIENT
Start: 2018-01-01 | End: 2018-01-16 | Stop reason: HOSPADM

## 2018-01-01 RX ORDER — GLYCOPYRROLATE 0.2 MG/ML
0.2 INJECTION INTRAMUSCULAR; INTRAVENOUS
Status: DISCONTINUED | OUTPATIENT
Start: 2018-01-01 | End: 2018-01-01 | Stop reason: HOSPADM

## 2018-01-01 RX ORDER — 0.9 % SODIUM CHLORIDE 0.9 %
500 INTRAVENOUS SOLUTION INTRAVENOUS ONCE
Status: COMPLETED | OUTPATIENT
Start: 2018-01-01 | End: 2018-01-01

## 2018-01-01 RX ORDER — SODIUM POLYSTYRENE SULFONATE 15 G/60ML
15 SUSPENSION ORAL; RECTAL ONCE
Status: COMPLETED | OUTPATIENT
Start: 2018-01-01 | End: 2018-01-01

## 2018-01-01 RX ORDER — SODIUM CHLORIDE 0.9 % (FLUSH) 0.9 %
10 SYRINGE (ML) INJECTION EVERY 12 HOURS SCHEDULED
Status: DISCONTINUED | OUTPATIENT
Start: 2018-01-01 | End: 2018-01-01 | Stop reason: HOSPADM

## 2018-01-01 RX ORDER — SODIUM CHLORIDE 0.9 % (FLUSH) 0.9 %
10 SYRINGE (ML) INJECTION PRN
Status: CANCELLED | OUTPATIENT
Start: 2018-01-01

## 2018-01-01 RX ORDER — ACETAMINOPHEN 325 MG/1
650 TABLET ORAL EVERY 4 HOURS PRN
Status: DISCONTINUED | OUTPATIENT
Start: 2018-01-01 | End: 2018-01-01 | Stop reason: HOSPADM

## 2018-01-01 RX ORDER — MORPHINE SULFATE 2 MG/ML
1 INJECTION, SOLUTION INTRAMUSCULAR; INTRAVENOUS ONCE
Status: DISCONTINUED | OUTPATIENT
Start: 2018-01-01 | End: 2018-01-01 | Stop reason: HOSPADM

## 2018-01-01 RX ORDER — DILTIAZEM HYDROCHLORIDE 120 MG/1
120 CAPSULE, COATED, EXTENDED RELEASE ORAL 2 TIMES DAILY
Status: DISCONTINUED | OUTPATIENT
Start: 2018-01-01 | End: 2018-01-01 | Stop reason: HOSPADM

## 2018-01-01 RX ORDER — IPRATROPIUM BROMIDE AND ALBUTEROL SULFATE 2.5; .5 MG/3ML; MG/3ML
1 SOLUTION RESPIRATORY (INHALATION) EVERY 4 HOURS PRN
Status: DISCONTINUED | OUTPATIENT
Start: 2018-01-01 | End: 2018-01-01 | Stop reason: HOSPADM

## 2018-01-01 RX ORDER — ACETAMINOPHEN 650 MG/1
650 SUPPOSITORY RECTAL EVERY 4 HOURS PRN
Status: DISCONTINUED | OUTPATIENT
Start: 2018-01-01 | End: 2018-01-01 | Stop reason: HOSPADM

## 2018-01-01 RX ORDER — GLYCOPYRROLATE 0.2 MG/ML
0.2 INJECTION INTRAMUSCULAR; INTRAVENOUS
Status: DISCONTINUED | OUTPATIENT
Start: 2018-01-01 | End: 2018-01-16 | Stop reason: HOSPADM

## 2018-01-01 RX ORDER — M-VIT,TX,IRON,MINS/CALC/FOLIC 27MG-0.4MG
1 TABLET ORAL DAILY
Status: DISCONTINUED | OUTPATIENT
Start: 2018-01-01 | End: 2018-01-01 | Stop reason: HOSPADM

## 2018-01-01 RX ORDER — ALLOPURINOL 100 MG/1
100 TABLET ORAL DAILY
Status: DISCONTINUED | OUTPATIENT
Start: 2018-01-01 | End: 2018-01-01 | Stop reason: HOSPADM

## 2018-01-01 RX ORDER — FAMOTIDINE 20 MG/1
20 TABLET, FILM COATED ORAL DAILY
Status: DISCONTINUED | OUTPATIENT
Start: 2018-01-01 | End: 2018-01-01 | Stop reason: HOSPADM

## 2018-01-01 RX ORDER — HALOPERIDOL 5 MG/ML
2 INJECTION INTRAMUSCULAR EVERY 6 HOURS PRN
Status: DISCONTINUED | OUTPATIENT
Start: 2018-01-01 | End: 2018-01-16 | Stop reason: HOSPADM

## 2018-01-01 RX ORDER — NICOTINE POLACRILEX 4 MG
15 LOZENGE BUCCAL PRN
Status: DISCONTINUED | OUTPATIENT
Start: 2018-01-01 | End: 2018-01-01 | Stop reason: HOSPADM

## 2018-01-01 RX ADMIN — DILTIAZEM HYDROCHLORIDE 120 MG: 120 CAPSULE, COATED, EXTENDED RELEASE ORAL at 08:36

## 2018-01-01 RX ADMIN — GLYCOPYRROLATE 0.2 MG: 0.2 INJECTION, SOLUTION INTRAMUSCULAR; INTRAVENOUS at 14:16

## 2018-01-01 RX ADMIN — FUROSEMIDE 20 MG: 20 TABLET ORAL at 08:36

## 2018-01-01 RX ADMIN — LEVOFLOXACIN 500 MG: 5 INJECTION, SOLUTION INTRAVENOUS at 19:30

## 2018-01-01 RX ADMIN — HYDRALAZINE HYDROCHLORIDE 100 MG: 50 TABLET, FILM COATED ORAL at 01:05

## 2018-01-01 RX ADMIN — Medication 10 ML: at 08:50

## 2018-01-01 RX ADMIN — LEVOFLOXACIN 250 MG: 5 INJECTION, SOLUTION INTRAVENOUS at 18:39

## 2018-01-01 RX ADMIN — MORPHINE SULFATE 1 MG: 2 INJECTION, SOLUTION INTRAMUSCULAR; INTRAVENOUS at 09:42

## 2018-01-01 RX ADMIN — HEPARIN SODIUM AND DEXTROSE 18 UNITS/KG/HR: 10000; 5 INJECTION INTRAVENOUS at 02:20

## 2018-01-01 RX ADMIN — ATORVASTATIN CALCIUM 40 MG: 40 TABLET, FILM COATED ORAL at 01:04

## 2018-01-01 RX ADMIN — ISOSORBIDE MONONITRATE 240 MG: 60 TABLET ORAL at 08:38

## 2018-01-01 RX ADMIN — MORPHINE SULFATE 30 MG: 1 INJECTION INTRAVENOUS at 11:16

## 2018-01-01 RX ADMIN — Medication 15 G: at 01:06

## 2018-01-01 RX ADMIN — HEPARIN SODIUM 8600 UNITS: 1000 INJECTION, SOLUTION INTRAVENOUS; SUBCUTANEOUS at 02:21

## 2018-01-01 RX ADMIN — INSULIN GLARGINE 30 UNITS: 100 INJECTION, SOLUTION SUBCUTANEOUS at 08:40

## 2018-01-01 RX ADMIN — ALLOPURINOL 100 MG: 100 TABLET ORAL at 08:36

## 2018-01-01 RX ADMIN — ACETAMINOPHEN 650 MG: 650 SUPPOSITORY RECTAL at 23:49

## 2018-01-01 RX ADMIN — VITAMIN D, TAB 1000IU (100/BT) 4000 UNITS: 25 TAB at 08:38

## 2018-01-01 RX ADMIN — ACETAMINOPHEN 650 MG: 650 SUPPOSITORY RECTAL at 04:20

## 2018-01-01 RX ADMIN — SODIUM CHLORIDE 500 ML: 9 INJECTION, SOLUTION INTRAVENOUS at 01:49

## 2018-01-01 RX ADMIN — ASPIRIN 325 MG: 325 TABLET ORAL at 08:36

## 2018-01-01 RX ADMIN — HEPARIN SODIUM 5000 UNITS: 5000 INJECTION, SOLUTION INTRAVENOUS; SUBCUTANEOUS at 08:40

## 2018-01-01 RX ADMIN — GABAPENTIN 300 MG: 300 CAPSULE ORAL at 01:05

## 2018-01-01 RX ADMIN — CALCITRIOL 0.25 MCG: 0.25 CAPSULE, LIQUID FILLED ORAL at 08:36

## 2018-01-01 RX ADMIN — DOCUSATE SODIUM 100 MG: 100 CAPSULE ORAL at 08:39

## 2018-01-01 RX ADMIN — METOPROLOL SUCCINATE 200 MG: 100 TABLET, FILM COATED, EXTENDED RELEASE ORAL at 08:39

## 2018-01-01 RX ADMIN — OXYBUTYNIN CHLORIDE 5 MG: 5 TABLET ORAL at 08:38

## 2018-01-01 RX ADMIN — INSULIN GLARGINE 26 UNITS: 100 INJECTION, SOLUTION SUBCUTANEOUS at 21:33

## 2018-01-01 RX ADMIN — OXYBUTYNIN CHLORIDE 5 MG: 5 TABLET ORAL at 13:40

## 2018-01-01 RX ADMIN — FAMOTIDINE 20 MG: 20 TABLET, FILM COATED ORAL at 08:39

## 2018-01-01 RX ADMIN — HALOPERIDOL LACTATE 2 MG: 5 INJECTION, SOLUTION INTRAMUSCULAR at 16:47

## 2018-01-01 RX ADMIN — MULTIPLE VITAMINS W/ MINERALS TAB 1 TABLET: TAB at 08:39

## 2018-01-01 RX ADMIN — DEXTROSE AND SODIUM CHLORIDE: 5; 450 INJECTION, SOLUTION INTRAVENOUS at 15:22

## 2018-01-01 RX ADMIN — SODIUM POLYSTYRENE SULFONATE 30 G: 15 SUSPENSION ORAL; RECTAL at 19:30

## 2018-01-01 RX ADMIN — CLONIDINE HYDROCHLORIDE 0.2 MG: 0.2 TABLET ORAL at 08:36

## 2018-01-01 RX ADMIN — OXYBUTYNIN CHLORIDE 5 MG: 5 TABLET ORAL at 01:04

## 2018-01-01 RX ADMIN — CLOPIDOGREL 75 MG: 75 TABLET, FILM COATED ORAL at 08:36

## 2018-01-01 RX ADMIN — DILTIAZEM HYDROCHLORIDE 120 MG: 120 CAPSULE, COATED, EXTENDED RELEASE ORAL at 01:05

## 2018-01-01 RX ADMIN — GABAPENTIN 300 MG: 300 CAPSULE ORAL at 08:38

## 2018-01-01 RX ADMIN — HEPARIN SODIUM 5000 UNITS: 5000 INJECTION, SOLUTION INTRAVENOUS; SUBCUTANEOUS at 21:18

## 2018-01-01 RX ADMIN — SODIUM CHLORIDE 1000 ML: 9 INJECTION, SOLUTION INTRAVENOUS at 19:30

## 2018-01-01 RX ADMIN — CLONIDINE HYDROCHLORIDE 0.2 MG: 0.2 TABLET ORAL at 01:04

## 2018-01-01 RX ADMIN — HEPARIN SODIUM 5000 UNITS: 5000 INJECTION, SOLUTION INTRAVENOUS; SUBCUTANEOUS at 01:06

## 2018-01-01 RX ADMIN — HYDRALAZINE HYDROCHLORIDE 100 MG: 50 TABLET, FILM COATED ORAL at 08:38

## 2018-01-01 ASSESSMENT — PAIN SCALES - GENERAL
PAINLEVEL_OUTOF10: 0
PAINLEVEL_OUTOF10: 4
PAINLEVEL_OUTOF10: 0
PAINLEVEL_OUTOF10: 0
PAINLEVEL_OUTOF10: 6
PAINLEVEL_OUTOF10: 0
PAINLEVEL_OUTOF10: 4
PAINLEVEL_OUTOF10: 5
PAINLEVEL_OUTOF10: 0
PAINLEVEL_OUTOF10: 0
PAINLEVEL_OUTOF10: 4
PAINLEVEL_OUTOF10: 6

## 2018-01-01 ASSESSMENT — PAIN DESCRIPTION - PAIN TYPE
TYPE: CHRONIC PAIN

## 2018-01-01 ASSESSMENT — PAIN DESCRIPTION - DESCRIPTORS
DESCRIPTORS: SHARP

## 2018-01-01 ASSESSMENT — PAIN DESCRIPTION - PROGRESSION
CLINICAL_PROGRESSION: GRADUALLY IMPROVING
CLINICAL_PROGRESSION: NOT CHANGED
CLINICAL_PROGRESSION: NOT CHANGED
CLINICAL_PROGRESSION: GRADUALLY WORSENING

## 2018-01-01 ASSESSMENT — PAIN DESCRIPTION - FREQUENCY
FREQUENCY: CONTINUOUS

## 2018-01-01 ASSESSMENT — PAIN DESCRIPTION - ONSET
ONSET: ON-GOING

## 2018-01-01 ASSESSMENT — PAIN DESCRIPTION - ORIENTATION
ORIENTATION: LEFT

## 2018-01-04 RX ORDER — DILTIAZEM HYDROCHLORIDE 120 MG/1
120 CAPSULE, EXTENDED RELEASE ORAL 2 TIMES DAILY
Status: ON HOLD | COMMUNITY
End: 2018-01-15

## 2018-01-04 RX ORDER — ONDANSETRON 4 MG/1
4 TABLET, FILM COATED ORAL EVERY 8 HOURS PRN
Status: ON HOLD | COMMUNITY
End: 2018-01-15

## 2018-01-04 RX ORDER — FUROSEMIDE 20 MG/1
20 TABLET ORAL DAILY
Status: ON HOLD | COMMUNITY
End: 2018-01-15

## 2018-01-04 RX ORDER — CALCITRIOL 0.25 UG/1
0.25 CAPSULE, LIQUID FILLED ORAL DAILY
Status: ON HOLD | COMMUNITY
End: 2018-01-15

## 2018-01-04 NOTE — PROGRESS NOTES
Sandra Shaw is a 69 y.o.oldmale came for follow up regarding his chronic kidney disease, Stage IV, of several year duration. Floyd Jackson most recent creatinine is 2.6 and has increased slightly. He lives at [facility]. BP are running 135-153. Denies dysuria, frequency, hesitancy, urgency or hematuria. denies frothy urine. reports edema Lt leg. . The pt denies use of NSAIDs. Has a good appetite and is remaining active. The pt denies Nausea, vomiting, diarrhea.          Current Outpatient Prescriptions   Medication Sig Dispense Refill    insulin glargine (LANTUS) 100 UNIT/ML injection vial Inject 30 Units into the skin every morning      insulin aspart (NOVOLOG) 100 UNIT/ML injection vial Inject 10 Units into the skin every morning With Sliding scale   -200= 3units  -250= 6units  -300= 9units  -350= 12units  -400= 15untis  BS> 400 CALL MD      gabapentin (NEURONTIN) 300 MG capsule Take 300 mg by mouth 3 times daily      acetaminophen (TYLENOL) 325 MG tablet Take 650 mg by mouth 3 times daily as needed for Pain      cloNIDine (CATAPRES) 0.2 MG tablet Take 1 tablet by mouth every 8 hours 90 tablet 1    sodium chloride 0.9 % infusion Infuse 50 mLs intravenously continuous for 1 day 1000 mL 0    isosorbide mononitrate (IMDUR) 120 MG extended release tablet Take 2 tablets by mouth daily 60 tablet 0    Dextromethorphan-guaiFENesin (ROBITUSSIN DM)  MG/5ML SYRP Take 10 mLs by mouth every 6 hours as needed for Cough       insulin glargine (LANTUS) 100 UNIT/ML injection vial Inject 26 Units into the skin nightly       hydrALAZINE (APRESOLINE) 100 MG tablet Take 100 mg by mouth 3 times daily  60 tablet     ondansetron (ZOFRAN-ODT) 4 MG disintegrating tablet Take 4 mg by mouth every 8 hours as needed for Nausea or Vomiting      calcitRIOL (ROCALTROL) 0.25 MCG capsule Take 0.25 mcg by mouth daily      glucagon, rDNA, 1 MG injection Inject 1 mg into the muscle as needed

## 2018-01-13 PROBLEM — E87.5 HYPERKALEMIA: Status: ACTIVE | Noted: 2018-01-13

## 2018-01-13 PROBLEM — N17.9 ACUTE KIDNEY INJURY SUPERIMPOSED ON CKD (HCC): Status: ACTIVE | Noted: 2018-01-01

## 2018-01-13 PROBLEM — R53.83 FATIGUE: Status: ACTIVE | Noted: 2018-01-13

## 2018-01-13 PROBLEM — J18.9 PNEUMONIA: Status: ACTIVE | Noted: 2018-01-13

## 2018-01-13 PROBLEM — N18.9 ACUTE KIDNEY INJURY SUPERIMPOSED ON CKD (HCC): Status: ACTIVE | Noted: 2018-01-13

## 2018-01-13 PROBLEM — J18.9 PNEUMONIA: Status: ACTIVE | Noted: 2018-01-01

## 2018-01-13 PROBLEM — N17.9 ACUTE KIDNEY INJURY SUPERIMPOSED ON CKD (HCC): Status: ACTIVE | Noted: 2018-01-13

## 2018-01-13 PROBLEM — E87.5 HYPERKALEMIA: Status: ACTIVE | Noted: 2018-01-01

## 2018-01-13 PROBLEM — R53.83 FATIGUE: Status: ACTIVE | Noted: 2018-01-01

## 2018-01-13 PROBLEM — N18.9 ACUTE KIDNEY INJURY SUPERIMPOSED ON CKD (HCC): Status: ACTIVE | Noted: 2018-01-01

## 2018-01-13 NOTE — ED PROVIDER NOTES
RELEASE TABLET    Take 2 tablets by mouth daily    METOPROLOL (TOPROL XL) 200 MG XL TABLET    Take 200 mg by mouth daily. NITROGLYCERIN (NITROSTAT) 0.4 MG SL TABLET    Place 0.4 mg under the tongue every 5 minutes as needed. ONDANSETRON (ZOFRAN) 4 MG TABLET    Take 4 mg by mouth every 8 hours as needed for Nausea or Vomiting    ONDANSETRON (ZOFRAN-ODT) 4 MG DISINTEGRATING TABLET    Take 4 mg by mouth every 8 hours as needed for Nausea or Vomiting    OXYBUTYNIN (DITROPAN) 5 MG TABLET    Take 1 tablet by mouth 3 times daily    PROMETHAZINE (PHENERGAN) 25 MG TABLET    Take 25 mg by mouth every 6 hours as needed for Nausea    ROSUVASTATIN (CRESTOR) 10 MG TABLET    Take 1 tablet by mouth nightly    SENNA-DOCUSATE (SENOKOT S) 8.6-50 MG PER TABLET    Take 1 tablet by mouth daily as needed for Constipation    SODIUM CHLORIDE 0.9 % INFUSION    Infuse 50 mLs intravenously continuous for 1 day    THERAPEUTIC MULTIVITAMIN-MINERALS (THERAGRAN-M) TABLET    Take 1 tablet by mouth daily. VITAMIN D (CHOLECALCIFEROL) 1000 UNIT TABS TABLET    Take 4,000 Units by mouth daily       ALLERGIES     is allergic to nsaids and tuberculin tests. FAMILY HISTORY     indicated that his mother is . He indicated that his father is . He indicated that his sister is . He indicated that his brother is . He indicated that his maternal grandmother is . He indicated that his maternal grandfather is . He indicated that his paternal grandmother is . He indicated that his paternal grandfather is . family history includes Heart Disease in his father and mother. SOCIAL HISTORY      reports that he quit smoking about 11 years ago. His smoking use included Cigarettes. He has a 40.00 pack-year smoking history. He has never used smokeless tobacco. He reports that he does not drink alcohol or use drugs.     PHYSICAL EXAM     INITIAL VITALS:  height is 6' (1.829 m) and weight is 223 lb (101.2 kg). His oral temperature is 97.9 °F (36.6 °C). His blood pressure is 133/97 (abnormal) and his pulse is 77. His respiration is 16 and oxygen saturation is 98%. Constitutional:  chronically ill-appearing  Eyes:  Pupils are equal and reactive, extraocular muscles intact   HENT:  Atraumatic appearing  oropharynx moist, no pharyngeal exudates. Neck- normal range of motion, supple   Respiratory:  Mild  wheezing, rhonchi or rales  Cardiovascular: regular, no murmur  GI:  Non tender, no rigidity, rebound or guarding  :  No costovertebral angle tenderness   Musculoskeletal:  2/4 distal pulses, no pitting edema  Integument: warm and dry  Neurologic: This patient has a history of previous stroke and has limited movement on the left side of his body. He has significant dysarthria and his speech is difficult to understand. He does seem somewhat confused. DIAGNOSTIC RESULTS     EKG: All EKG's are interpreted by the Emergency Department Physician who either signs or Co-signs this chart in the absence of a cardiologist.   EKG interpreted by me showing sinus rhythm at rate 75, QRS of 128, QTC of 489, axis of -10. Nonspecific intraventricular block.     RADIOLOGY: non-plain film images(s) such as CT, Ultrasound and MRI are read by the radiologist.  Chest x-ray was interpreted by the radiologist showing evidence of right lower lobe infiltrates and pleural effusion    LABS:   Labs Reviewed   BLOOD GAS, ARTERIAL - Abnormal; Notable for the following:        Result Value    pH, Blood Gas 7.28 (*)     PCO2 49 (*)     Base Excess (Calculated) -3.8 (*)     All other components within normal limits   BASIC METABOLIC PANEL - Abnormal; Notable for the following:     Potassium 5.9 (*)     Glucose 124 (*)     BUN 42 (*)     CREATININE 4.2 (*)     All other components within normal limits   CBC WITH AUTO DIFFERENTIAL - Abnormal; Notable for the following:     RBC 3.85 (*)     Hemoglobin 12.4 (*)     Hematocrit 38.8

## 2018-01-14 NOTE — ED NOTES
Called [facility]K and spoke with Alejandra Joyce. Pt to be transported to [facility] in stable condition.      Feroz Falk  01/13/18 2045

## 2018-01-14 NOTE — ED NOTES
Patient to the floor via cart with tech. Vitals stable. No needs voiced at this time. Family at bedside.       Maksim Stratton RN  01/13/18 [id number]

## 2018-01-14 NOTE — CONSULTS
[facility]    [facility]  [address]  [place], [address]  [address] [place] Nephrology Consult      1/14/2018 1:55 PM         Pt Name:    Theresa Pruitt  MRN:     [id number]   [id number]  YOB: 1950  Admit Date:    1/13/2018  4:12 PM  Primary Care Physician:  No primary care provider on file. Room Number:  8U-29/644-N   Reason for Consult:  Decreased eGFR  Requesting Providor: Dr. Emil Anderson  Primary Nephrologist: Sil Stiles, [facility] Location:                [facility]94/852-C    ### ISOLATION ###:   none     Admit Chief Complaint: abnormal labs showing declining kidney function. History of Chief Complaint:   The patient is a 69y.o. year old black male who is followed by Sil Stiles, CNP-nephrology for CKD stage IV from DM, HTN and polycystic kidneys. He was sent to ER for a change in his kidney function for the worse. The patient is hard to interview due to previous CVA. Apparently he has not been eating well. Nephrology is following the patient for: acute kidney injury and chronic kidney disease. 24 hour summary:   The patient was relaxing in bed. He did not talk with me. He looked comfortable and is breathing easily. Baseline eGFR 30-35 ml/min   Admit eGFR 17 ml/min   Current eGFR 19 ml/min     Compliance with treatment plan: unknown     Allergies and Intolerances:   ALLERGIES: Nsaids and Tuberculin tests  MEDICATION INTOLERANCES: none  FOOD ALLERGIES: none  INSECT ALLERGIES: none  PLANT ALLERGIES: none     Past Medical History:  Past Medical History:   Diagnosis Date    Blood circulation, collateral     PAD    CAD (coronary artery disease)     MI    Chest pain     CHF (congestive heart failure) (HCC)     Convulsion (HCC)     Diabetes mellitus ([address]     Dysphagia as late effect of cerebrovascular disease     GERD (gastroesophageal reflux disease)     Gout     Hyperlipidemia     Hypertension     Medtronic single ICD 2017 Lucy Amezcua 6/8/2017    Morbid obesity ([facility]     Myocardial infarction     Painful bladder spasm 2/21/2016    Polycystic kidney, unspecified type     Prostate cancer ([facility]     Renal insufficiency     Sleep apnea     Speech and language deficit as late effect of stroke     Stroke ([facility]     massive stroke- years ago  Lt sided hemiplegia 8-9 years ago        Past Surgical History:  Past Surgical History:   Procedure Laterality Date    APPENDECTOMY      CARDIAC CATHETERIZATION  5 04 2011    Successful Perclose coronary intervention OM1 w/ 3.4 X 28 mm bare metal stent and 2.75 X 12 mm bare metal stent. Critical lesion at the right external iliac artery.  CARDIAC CATHETERIZATION  5 04 2011    Significant HTN noted w/ no AS. Coronaries diffues 3 vessel disease. Multiple degree. Culprit is cirucmflex diffuse marginal and less evident in RCA distribution as described 60-70%. LV anterior apical hypokinesia, EF 40%. Severe iliac disease.  CARDIAC CATHETERIZATION  6-25-12    COLONOSCOPY      DIAGNOSTIC CARDIAC CATH LAB PROCEDURE  05/06/11    Successful percutaneous intervention of right external iliac artery. Successful insertion of the 8.0 X 40 mm stent in right external iliac artery. Significant PVD in both lower extremities.  ENDOSCOPY, COLON, DIAGNOSTIC      PACEMAKER PLACEMENT      TRANSTHORACIC ECHOCARDIOGRAM  05/02/11    LV size normal. Systolic function moderately reduced. EF 40-45%. Moderate diffuse hypokinesis. Wall thickness mildly increased. Grade 1 diastolic dysfunction. Mild annular calcification. Mild MR and AR. Moderate TR. SPAP 40mmHg.  TRANSTHORACIC ECHOCARDIOGRAM  5-25-12    TRANSTHORACIC ECHOCARDIOGRAM  9-17-13        Family History:  Family History   Problem Relation Age of Onset    Heart Disease Mother     Heart Disease Father         Social History:  Social History     Social History    Marital status:       Spouse name: N/A    Number of children: 3    Years of education: the tongue every 5 minutes as needed. Yes Historical Provider, MD   allopurinol (ZYLOPRIM) 100 MG tablet Take 100 mg by mouth daily. Yes Historical Provider, MD   clopidogrel (PLAVIX) 75 MG tablet Take 75 mg by mouth daily. Yes Historical Provider, MD   therapeutic multivitamin-minerals (THERAGRAN-M) tablet Take 1 tablet by mouth daily. Yes Historical Provider, MD   sodium chloride 0.9 % infusion Infuse 50 mLs intravenously continuous for 1 day 9/14/17 9/15/17  Nayana Quiroga MD   vitamin D (CHOLECALCIFEROL) 1000 UNIT TABS tablet Take 4,000 Units by mouth daily    Historical Provider, MD        Lab data:  CBC:    Recent Labs      01/13/18   1701  01/14/18   0908   WBC  8.0  11.2*   HGB  12.4*  11.9*   PLT  235  226     CMP:    Recent Labs      01/13/18   1701  01/13/18   2307  01/14/18   0908   NA  140   --   140   K  5.9*  5.9*  5.1   CL  102   --   102   CO2  23   --   20*   BUN  42*   --   45*   CREATININE  4.2*   --   3.9*   GLUCOSE  124*   --   147*   CALCIUM  9.1   --   9.0     Urine:No results for input(s): COLORU, PHUR, LABCAST, WBCUA, RBCUA, MUCUS, TRICHOMONAS, YEAST, BACTERIA, CLARITYU, SPECGRAV, LEUKOCYTESUR, UROBILINOGEN, Susanne Em in the last 72 hours. Invalid input(s): NITRATE, GLUCOSEUKETONESUAMORPHOUS   Sed Rate: No results for input(s): SEDRATE in the last 72 hours. Radiology       Review of Systems:  Source of data: chart and staff    CONSTITUTIONAL  Fever: none, Chills: none, Weight loss: none, Malaise: none, Fatigue: yes, Diaphoresis: none,   Weakness: none    SKIN  Rash: none, Itch: none, Open sores: none    HENT:  Headache: none, Hearing loss: none, Tinnitus: none, Ear pain: none, Ear discharge: none,   Nasal bleeding: none, Congestion: none, Stridor: none, Sore throat: none. EYES  Blurred vision: none, Double vision: none, Photophobia: none, Eye pain: none, Eye discharge: none,  Eye redness: none.     CARDIOVASCULAR  Chest pain: none, Arm pain: none, noted.  Muscles: Hand  is equal and strong bilaterally. Leg strength is equal and strong. Skeletal: No bony or skeletal abnormalities noted. Admission weight: 223 lb (101.2 kg)  Wt Readings from Last 3 Encounters:   01/14/18 236 lb (107 kg)   10/15/17 223 lb 9 oz (101.4 kg)   09/14/17 226 lb (102.5 kg)     Body mass index is 32.01 kg/m². Clinical Impressions:    1. Acute kidney injury perhaps from dehydration. 2. Chronic kidney disease stage IV from DM, HTN and polycystic kidneys. 3. DM  4. HTN  5. Stage III obesity  6. DM  7. HTN  8. 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 Avoid all: NSAIDS, ACEI, ARB, fleets phospho enema  2. Urine electrolytes  3. IV hydration         [place]. DO Subha  [facility].

## 2018-01-14 NOTE — H&P
Surgical History:        Procedure Laterality Date    APPENDECTOMY      CARDIAC CATHETERIZATION  5 04 2011    Successful Perclose coronary intervention OM1 w/ 3.4 X 28 mm bare metal stent and 2.75 X 12 mm bare metal stent. Critical lesion at the right external iliac artery.  CARDIAC CATHETERIZATION  5 04 2011    Significant HTN noted w/ no AS. Coronaries diffues 3 vessel disease. Multiple degree. Culprit is cirucmflex diffuse marginal and less evident in RCA distribution as described 60-70%. LV anterior apical hypokinesia, EF 40%. Severe iliac disease.  CARDIAC CATHETERIZATION  6-25-12    COLONOSCOPY      DIAGNOSTIC CARDIAC CATH LAB PROCEDURE  05/06/11    Successful percutaneous intervention of right external iliac artery. Successful insertion of the 8.0 X 40 mm stent in right external iliac artery. Significant PVD in both lower extremities.  ENDOSCOPY, COLON, DIAGNOSTIC      PACEMAKER PLACEMENT      TRANSTHORACIC ECHOCARDIOGRAM  05/02/11    LV size normal. Systolic function moderately reduced. EF 40-45%. Moderate diffuse hypokinesis. Wall thickness mildly increased. Grade 1 diastolic dysfunction. Mild annular calcification. Mild MR and AR. Moderate TR. SPAP 40mmHg.  TRANSTHORACIC ECHOCARDIOGRAM  5-25-12    TRANSTHORACIC ECHOCARDIOGRAM  9-17-13       Home Medications:   No current facility-administered medications on file prior to encounter.       Current Outpatient Prescriptions on File Prior to Encounter   Medication Sig Dispense Refill    calcitRIOL (ROCALTROL) 0.25 MCG capsule Take 0.25 mcg by mouth daily      furosemide (LASIX) 20 MG tablet Take 20 mg by mouth daily      diltiazem (CARDIZEM 12 HR) 120 MG extended release capsule Take 120 mg by mouth 2 times daily      ondansetron (ZOFRAN) 4 MG tablet Take 4 mg by mouth every 8 hours as needed for Nausea or Vomiting      gabapentin (NEURONTIN) 300 MG capsule Take 300 mg by mouth 3 times daily      acetaminophen (TYLENOL) 325 MG tablet Range    WBC 8.0 4.8 - 10.8 thou/mm3    RBC 3.85 (L) 4.70 - 6.10 mill/mm3    Hemoglobin 12.4 (L) 14.0 - 18.0 gm/dl    Hematocrit 38.8 (L) 42.0 - 52.0 %    .9 (H) 80.0 - 94.0 fL    MCH 32.2 (H) 27.0 - 31.0 pg    MCHC 31.9 (L) 33.0 - 37.0 gm/dl    RDW 17.9 (H) 11.5 - 14.5 %    Platelets 362 325 - 102 thou/mm3    MPV 7.7 7.4 - 10.4 mcm    Seg Neutrophils 63.9 %    Lymphocytes 21.3 %    Monocytes 13.6 %    Eosinophils 0.0 %    Basophils 1.2 %    nRBC 1 /100 wbc    Macrocytes 1+ Absent    Anisocytosis 1+ Absent    Segs Absolute 5.1 1.8 - 7.7 thou/mm3    Lymphocytes # 1.7 1.0 - 4.8 thou/mm3    Monocytes # 1.1 0.4 - 1.3 thou/mm3    Eosinophils # 0.0 0.0 - 0.4 thou/mm3    Basophils # 0.1 0.0 - 0.1 thou/mm3   Brain Natriuretic Peptide    Collection Time: 01/13/18  5:01 PM   Result Value Ref Range    Pro-BNP 44986.0 (H) 0.0 - 900.0 pg/mL   Hepatic function panel    Collection Time: 01/13/18  5:01 PM   Result Value Ref Range    Alb 3.7 3.5 - 5.1 g/dL    Total Bilirubin 0.4 0.3 - 1.2 mg/dL    Bilirubin, Direct <0.2 0.0 - 0.3 mg/dL    Alkaline Phosphatase 87 38 - 126 U/L    AST 20 5 - 40 U/L    ALT 13 11 - 66 U/L    Total Protein 8.0 6.1 - 8.0 g/dL   Lactic acid, plasma    Collection Time: 01/13/18  5:01 PM   Result Value Ref Range    Lactic Acid 1.2 0.5 - 2.2 mmol/L   Anion Gap    Collection Time: 01/13/18  5:01 PM   Result Value Ref Range    Anion Gap 15.0 8.0 - 16.0 meq/L   Osmolality    Collection Time: 01/13/18  5:01 PM   Result Value Ref Range    Osmolality Calc 291.3 275.0 - 300 mOsmol/kg   Glomerular Filtration Rate, Estimated    Collection Time: 01/13/18  5:01 PM   Result Value Ref Range    Est, Glom Filt Rate 17 (A) ml/min/1.73m2   Blood Gas, Arterial    Collection Time: 01/13/18  5:39 PM   Result Value Ref Range    pH, Blood Gas 7.28 (L) 7.35 - 7.45    PCO2 49 (H) 35 - 45 mmhg    PO2 94 71 - 104 mmhg    HCO3 23 23 - 28 mmol/l    Base Excess (Calculated) -3.8 (L) -2.5 - 2.5 mmol/l    O2 Sat 96 %    IFIO2 6 DEVICE Cannula     Source: L Radial     COLLECTED BY: [id number]    Ammonia    Collection Time: 01/13/18  5:40 PM   Result Value Ref Range    Ammonia 20 11 - 60 umol/L       Radiology:     Ct Head Wo Contrast    Result Date: 1/13/2018  PROCEDURE: CT HEAD WO CONTRAST CLINICAL INFORMATION: ams. COMPARISON: 9/11/2017 TECHNIQUE: Noncontrast 5 mm axial images were obtained through the brain. All CT scans at this facility use dose modulation, iterative reconstruction, and/or weight-based dosing when appropriate to reduce radiation dose to as low as reasonably achievable. 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 There are no intra-or extra-axial collections. There is ex vacuo dilatation of the right lateral ventricle  mild white matter changes of chronic small vessel ischemic disease. The paranasal sinuses and mastoid air cells are normally aerated. There is no suspicious calvarial abnormality. No evidence of an acute process. **This report has been created using voice recognition software. It may contain minor errors which are inherent in voice recognition technology. ** Final report electronically signed by Dr. Trevor Lawler on 1/13/2018 5:21 PM    Xr Chest 1 Vw    Result Date: 1/13/2018  PROCEDURE: XR CHEST LIMITED ONE VIEW CLINICAL INFORMATION: short of breath,  . COMPARISON: 10/15/2017 TECHNIQUE: AP sitting FINDINGS: Patient is rotated. Heart size is indeterminant probably within normal limits given the depth of inspiration and positioning. Right pleural effusion right lower lobe opacities. Normal vasculature. AICD over the left chest.     Right lower lobe infiltrates and pleural effusion. **This report has been created using voice recognition software. It may contain minor errors which are inherent in voice recognition technology. ** Final report electronically signed by Dr. Trevor Lawler on 1/13/2018 5:28 PM        EKG: Normal sinus

## 2018-01-15 PROBLEM — J96.90 RESPIRATORY FAILURE (HCC): Status: ACTIVE | Noted: 2018-01-15

## 2018-01-15 PROBLEM — J96.90 RESPIRATORY FAILURE (HCC): Status: ACTIVE | Noted: 2018-01-01

## 2018-01-15 NOTE — CONSULTS
diffues 3 vessel disease. Multiple degree. Culprit is cirucmflex diffuse marginal and less evident in RCA distribution as described 60-70%. LV anterior apical hypokinesia, EF 40%. Severe iliac disease.  CARDIAC CATHETERIZATION  6-25-12    COLONOSCOPY      DIAGNOSTIC CARDIAC CATH LAB PROCEDURE  05/06/11    Successful percutaneous intervention of right external iliac artery. Successful insertion of the 8.0 X 40 mm stent in right external iliac artery. Significant PVD in both lower extremities.  ENDOSCOPY, COLON, DIAGNOSTIC      PACEMAKER PLACEMENT      TRANSTHORACIC ECHOCARDIOGRAM  05/02/11    LV size normal. Systolic function moderately reduced. EF 40-45%. Moderate diffuse hypokinesis. Wall thickness mildly increased. Grade 1 diastolic dysfunction. Mild annular calcification. Mild MR and AR. Moderate TR. SPAP 40mmHg.  TRANSTHORACIC ECHOCARDIOGRAM  5-25-12    TRANSTHORACIC ECHOCARDIOGRAM  9-17-13       Medications Prior to Admission:      Prior to Admission medications    Medication Sig Start Date End Date Taking?  Authorizing Provider   promethazine (PHENERGAN) 25 MG tablet Take 25 mg by mouth every 6 hours as needed for Nausea   Yes Historical Provider, MD   calcitRIOL (ROCALTROL) 0.25 MCG capsule Take 0.25 mcg by mouth daily   Yes Historical Provider, MD   furosemide (LASIX) 20 MG tablet Take 20 mg by mouth daily   Yes Historical Provider, MD   diltiazem (CARDIZEM 12 HR) 120 MG extended release capsule Take 120 mg by mouth 2 times daily   Yes Historical Provider, MD   ondansetron (ZOFRAN) 4 MG tablet Take 4 mg by mouth every 8 hours as needed for Nausea or Vomiting   Yes Historical Provider, MD   gabapentin (NEURONTIN) 300 MG capsule Take 300 mg by mouth 3 times daily   Yes Historical Provider, MD   acetaminophen (TYLENOL) 325 MG tablet Take 650 mg by mouth 3 times daily as needed for Pain   Yes Historical Provider, MD   cloNIDine (CATAPRES) 0.2 MG tablet Take 1 tablet by mouth every 8 hours 9/14/17 Yes Hayley Wolf MD   isosorbide mononitrate (IMDUR) 120 MG extended release tablet Take 2 tablets by mouth daily 6/23/17  Yes Thu Alejandre MD   Dextromethorphan-guaiFENesin (ROBITUSSIN DM)  MG/5ML SYRP Take 10 mLs by mouth every 6 hours as needed for Cough    Yes Historical Provider, MD   insulin glargine (LANTUS) 100 UNIT/ML injection vial Inject 30 Units into the skin every morning   Yes Historical Provider, MD   insulin glargine (LANTUS) 100 UNIT/ML injection vial Inject 26 Units into the skin nightly    Yes Historical Provider, MD   hydrALAZINE (APRESOLINE) 100 MG tablet Take 100 mg by mouth 3 times daily  5/11/17  Yes Bonifacio Garcia CNP   ondansetron (ZOFRAN-ODT) 4 MG disintegrating tablet Take 4 mg by mouth every 8 hours as needed for Nausea or Vomiting   Yes Historical Provider, MD   glucagon, rDNA, 1 MG injection Inject 1 mg into the muscle as needed for Low blood sugar    Yes Historical Provider, MD   glucose (GLUTOSE 15) 40 % GEL Take 15 g by mouth as needed (low blood sugar)    Yes Historical Provider, MD   Calcium Carbonate Antacid (MAALOX PO) Take 1 tablet by mouth every 6 hours as needed    Yes Historical Provider, MD   senna-docusate (SENOKOT S) 8.6-50 MG per tablet Take 1 tablet by mouth daily as needed for Constipation   Yes Historical Provider, MD   famotidine (PEPCID) 20 MG tablet Take 1 tablet by mouth daily 8/18/16  Yes Ramsey Perea MD   aspirin 325 MG tablet Take 1 tablet by mouth daily 2/26/16  Yes Mihir William MD   rosuvastatin (CRESTOR) 10 MG tablet Take 1 tablet by mouth nightly 2/26/16  Yes Mihir William MD   diltiazem (CARDIZEM CD) 120 MG ER capsule Take 1 capsule by mouth 2 times daily 2/26/16  Yes Mihir William MD   docusate sodium (COLACE) 100 MG capsule Take 1 capsule by mouth as needed 2/26/16  Yes Mihir William MD   oxybutynin (DITROPAN) 5 MG tablet Take 1 tablet by mouth 3 times daily 2/26/16  Yes Mihir William MD   albuterol (PROVENTIL HFA;VENTOLIN HFA) 108 (90 BASE) MCG/ACT inhaler Inhale 2 puffs into the lungs every 6 hours as needed for Wheezing   Yes Historical Provider, MD   insulin aspart (NOVOLOG) 100 UNIT/ML injection vial Inject 10 Units into the skin every morning With Sliding scale   -200= 3units  -250= 6units  -300= 9units  -350= 12units  -400= 15[place]  BS> 400 CALL MD   Yes Historical Provider, MD   metoprolol (TOPROL XL) 200 MG XL tablet Take 200 mg by mouth daily. Yes Historical Provider, MD   nitroGLYCERIN (NITROSTAT) 0.4 MG SL tablet Place 0.4 mg under the tongue every 5 minutes as needed. Yes Historical Provider, MD   allopurinol (ZYLOPRIM) 100 MG tablet Take 100 mg by mouth daily. Yes Historical Provider, MD   clopidogrel (PLAVIX) 75 MG tablet Take 75 mg by mouth daily. Yes Historical Provider, MD   therapeutic multivitamin-minerals (THERAGRAN-M) tablet Take 1 tablet by mouth daily. Yes Historical Provider, MD   sodium chloride 0.9 % infusion Infuse 50 mLs intravenously continuous for 1 day 9/14/17 9/15/17  Christina Riley MD   vitamin D (CHOLECALCIFEROL) 1000 UNIT TABS tablet Take 4,000 Units by mouth daily    Historical Provider, MD       Allergies:  Nsaids and Tuberculin tests    Social History:        TOBACCO:   reports that he quit smoking about 11 years ago. His smoking use included Cigarettes. He has a 40.00 pack-year smoking history. He has never used smokeless tobacco.  ETOH:   reports that he does not drink alcohol. Family History:            Problem Relation Age of Onset    Heart Disease Mother     Heart Disease Father          Review of Systems - General ROS: Fatigue  Psychological ROS: negative  Hematological and Lymphatic ROS: No history of blood clots or bleeding disorder.    Respiratory ROS: no cough, c/o shortness of breath  Cardiovascular ROS: no chest pain   Gastrointestinal ROS: negative  Genito-Urinary ROS: no dysuria, trouble voiding, or hematuria  Musculoskeletal ROS: negative  Neurological ROS: Left-sided weakness  Dermatological ROS: negative      /84   Pulse 96   Temp 100.3 °F (37.9 °C) (Axillary)   Resp 20   Ht 6' (1.829 m)   Wt 236 lb (107 kg)   SpO2 91%   BMI 32.01 kg/m²       Physical Examination: General appearance -not in distress  Mental status - drowsy but follows commands  Neck - supple, no significant adenopathy, no JVD  Chest - reduced at entry to right lower lobe, few rhonchi   Heart - normal rate, regular rhythm, normal S1, S2, no murmurs  Abdomen - soft, nontender, nondistended, no masses or organomegaly  Neurological - left hemiparesis  Musculoskeletal - no joint tenderness, deformity or swelling  Extremities - peripheral pulses normal, no pedal edema  Skin - normal coloration and turgor, no rashes, no suspicious skin lesions noted      EKG   Normal sinus rhythm  Nonspecific ST-T changes    Cath in 2013  Left main widely patent. It gives rise to LAD and left circumflex. Both LAD and left circumflex are large but right dominant circulation. LAD ostial 30 percent stenosis. Proximal 40 percent stenosis with mild luminal irregularity. Left circumflex large vessel, ostial 30 percent stenosis. Proximal 40 percent stenosis just prior to the takeoff of OM-1. Ricky Morrison is a large vessel and had stent in the proximal segment and the stent is widely patent. RCA proximal 30 percent stenosis, mid 65-70 percent stenosis. In a tandem fashion downstream another section or site of mid right coronary artery stenosis, 65-70 percent. On the mid right coronary artery, we have two lesions which are in tandem 60-70 percent stenosis. Distal right coronary artery 50-60 percent stenosis.   Extremely ectatic artery and significant plaque buildup and diffusely fromproximal to distal.     RIGHT FEMORAL ARTERY ANGIOGRAM:  Right femoral artery angiogram revealed basically the superficial femoral artery is 100 percent occluded just at the branching from the are no intra-or extra-axial collections. There is ex vacuo dilatation of the right lateral ventricle  mild white matter changes of chronic small vessel ischemic disease. The paranasal sinuses and mastoid air cells are normally aerated. There is no suspicious calvarial abnormality. No evidence of an acute process. **This report has been created using voice recognition software. It may contain minor errors which are inherent in voice recognition technology. ** Final report electronically signed by Dr. Evan Dorantes on 1/13/2018 5:21 PM    Xr Chest 1 Vw    Result Date: 1/13/2018  PROCEDURE: XR CHEST LIMITED ONE VIEW CLINICAL INFORMATION: short of breath,  . COMPARISON: 10/15/2017 TECHNIQUE: AP sitting FINDINGS: Patient is rotated. Heart size is indeterminant probably within normal limits given the depth of inspiration and positioning. Right pleural effusion right lower lobe opacities. Normal vasculature. AICD over the left chest.     Right lower lobe infiltrates and pleural effusion. **This report has been created using voice recognition software. It may contain minor errors which are inherent in voice recognition technology. ** Final report electronically signed by Dr. Evan Dorantes on 1/13/2018 5:28 PM        Assessment/Plan:    Altered mental status  Right lower lobe pneumonia  Continue antibiotic and current care as per primary care team.    No need for any invasive cardiac workup at present. Elevated troponin most probably due to sepsis and CK-MB  Coronary artery disease seems to be stable. Denies angina or change Plavix in breathing pattern. Continue ASA/Plavix/Imdur/BB/Statin. Hypertension, on medical treatment. Seems to be under good control. Diabetes mellitus: Patient needs very tight control to minimize cardiac risk. Dyslipidemia: On statin. NYHA class II CHF. EF - 35-40%  Well compensated. No recent hospitalization for CHF.   Will need periodic echocardiograms depending on

## 2018-01-15 NOTE — PROGRESS NOTES
Upon assessment, patient is very lethargic and does not respond to anything other than sternal rub. Patient unable to answer questions, unable to take pills. Urine is blood tinged. Temp of 100.3. CPAP placed on patient. Spoke with Dr. Fani Tavarez. Orders for stat abgs, troponins, ekg, tylenol suppository, flu screening, lactic, and UA    ABGs called to Dr. Fani Tavarez.  BIPAP ordered and ABGs in 2 hours

## 2018-01-15 NOTE — PROGRESS NOTES
Removed Bipap. Patient planed on 3 L NC for comfort. Patient remains unresponsive. Happy Jack (significant other) at bedside. Palliative care and hospice RN at bedside. Emotional support given.

## 2018-01-16 LAB
EKG ATRIAL RATE: 101 BPM
EKG P AXIS: 65 DEGREES
EKG P-R INTERVAL: 162 MS
EKG Q-T INTERVAL: 382 MS
EKG QRS DURATION: 128 MS
EKG QTC CALCULATION (BAZETT): 495 MS
EKG R AXIS: -11 DEGREES
EKG T AXIS: 146 DEGREES
EKG VENTRICULAR RATE: 101 BPM

## 2018-01-17 LAB
LEGIONELLA URINARY AG: NEGATIVE
STREP PNEUMO AG, UR: NEGATIVE
URINE CULTURE REFLEX: NORMAL

## 2018-01-18 NOTE — DISCHARGE SUMMARY
Death Note/Discharge summary    Pt is a 70 y/o who was being managed in conjunction with the Hospice team for end of life. Pt finally gave up on 1/15/18 at [address] [place] [address].  Likely due to cardiopulmonary arrest  Remains were released to family

## 2018-01-19 LAB — BLOOD CULTURE, ROUTINE: NORMAL

## 2020-02-05 NOTE — PROGRESS NOTES
Pt's upper and lower dentures were taken out of mouth per pt request. Dentures placed on counter in denture cup with a pt sticker on it Complex Repair And Flap Additional Text (Will Appearing After The Standard Complex Repair Text): The complex repair was not sufficient to completely close the primary defect. The remaining additional defect was repaired with the flap mentioned below.